# Patient Record
Sex: FEMALE | Race: WHITE | Employment: FULL TIME | URBAN - METROPOLITAN AREA
[De-identification: names, ages, dates, MRNs, and addresses within clinical notes are randomized per-mention and may not be internally consistent; named-entity substitution may affect disease eponyms.]

---

## 2024-07-09 ENCOUNTER — OFFICE VISIT (OUTPATIENT)
Dept: FAMILY MEDICINE CLINIC | Facility: CLINIC | Age: 44
End: 2024-07-09
Payer: COMMERCIAL

## 2024-07-09 ENCOUNTER — LAB ENCOUNTER (OUTPATIENT)
Dept: LAB | Age: 44
End: 2024-07-09
Attending: FAMILY MEDICINE
Payer: COMMERCIAL

## 2024-07-09 VITALS
RESPIRATION RATE: 16 BRPM | BODY MASS INDEX: 39.08 KG/M2 | WEIGHT: 207 LBS | HEIGHT: 61 IN | TEMPERATURE: 98 F | OXYGEN SATURATION: 97 % | HEART RATE: 90 BPM | SYSTOLIC BLOOD PRESSURE: 136 MMHG | DIASTOLIC BLOOD PRESSURE: 80 MMHG

## 2024-07-09 DIAGNOSIS — M76.61 RIGHT ACHILLES TENDINITIS: ICD-10-CM

## 2024-07-09 DIAGNOSIS — Z00.00 ANNUAL PHYSICAL EXAM: Primary | ICD-10-CM

## 2024-07-09 DIAGNOSIS — Z32.01: ICD-10-CM

## 2024-07-09 DIAGNOSIS — Z12.31 SCREENING MAMMOGRAM FOR BREAST CANCER: ICD-10-CM

## 2024-07-09 DIAGNOSIS — L73.2 HIDRADENITIS: ICD-10-CM

## 2024-07-09 DIAGNOSIS — Z00.00 ANNUAL PHYSICAL EXAM: ICD-10-CM

## 2024-07-09 LAB
ALBUMIN SERPL-MCNC: 3.4 G/DL (ref 3.4–5)
ALBUMIN/GLOB SERPL: 0.7 {RATIO} (ref 1–2)
ALP LIVER SERPL-CCNC: 54 U/L
ALT SERPL-CCNC: 22 U/L
ANION GAP SERPL CALC-SCNC: 8 MMOL/L (ref 0–18)
AST SERPL-CCNC: 16 U/L (ref 15–37)
BASOPHILS # BLD AUTO: 0.04 X10(3) UL (ref 0–0.2)
BASOPHILS NFR BLD AUTO: 0.4 %
BILIRUB SERPL-MCNC: 0.3 MG/DL (ref 0.1–2)
BUN BLD-MCNC: 14 MG/DL (ref 9–23)
CALCIUM BLD-MCNC: 9 MG/DL (ref 8.5–10.1)
CHLORIDE SERPL-SCNC: 108 MMOL/L (ref 98–112)
CHOLEST SERPL-MCNC: 141 MG/DL (ref ?–200)
CO2 SERPL-SCNC: 22 MMOL/L (ref 21–32)
CREAT BLD-MCNC: 0.68 MG/DL
EGFRCR SERPLBLD CKD-EPI 2021: 111 ML/MIN/1.73M2 (ref 60–?)
EOSINOPHIL # BLD AUTO: 0.11 X10(3) UL (ref 0–0.7)
EOSINOPHIL NFR BLD AUTO: 1 %
ERYTHROCYTE [DISTWIDTH] IN BLOOD BY AUTOMATED COUNT: 13.3 %
FASTING PATIENT LIPID ANSWER: YES
FASTING STATUS PATIENT QL REPORTED: YES
GLOBULIN PLAS-MCNC: 4.8 G/DL (ref 2.8–4.4)
GLUCOSE BLD-MCNC: 101 MG/DL (ref 70–99)
HCG SERPL QL: POSITIVE
HCT VFR BLD AUTO: 38.8 %
HDLC SERPL-MCNC: 40 MG/DL (ref 40–59)
HGB BLD-MCNC: 12.6 G/DL
IMM GRANULOCYTES # BLD AUTO: 0.04 X10(3) UL (ref 0–1)
IMM GRANULOCYTES NFR BLD: 0.4 %
LDLC SERPL CALC-MCNC: 88 MG/DL (ref ?–100)
LYMPHOCYTES # BLD AUTO: 2.8 X10(3) UL (ref 1–4)
LYMPHOCYTES NFR BLD AUTO: 25 %
MCH RBC QN AUTO: 29.8 PG (ref 26–34)
MCHC RBC AUTO-ENTMCNC: 32.5 G/DL (ref 31–37)
MCV RBC AUTO: 91.7 FL
MONOCYTES # BLD AUTO: 0.94 X10(3) UL (ref 0.1–1)
MONOCYTES NFR BLD AUTO: 8.4 %
NEUTROPHILS # BLD AUTO: 7.27 X10 (3) UL (ref 1.5–7.7)
NEUTROPHILS # BLD AUTO: 7.27 X10(3) UL (ref 1.5–7.7)
NEUTROPHILS NFR BLD AUTO: 64.8 %
NONHDLC SERPL-MCNC: 101 MG/DL (ref ?–130)
OSMOLALITY SERPL CALC.SUM OF ELEC: 287 MOSM/KG (ref 275–295)
PLATELET # BLD AUTO: 288 10(3)UL (ref 150–450)
POTASSIUM SERPL-SCNC: 4 MMOL/L (ref 3.5–5.1)
PROT SERPL-MCNC: 8.2 G/DL (ref 6.4–8.2)
RBC # BLD AUTO: 4.23 X10(6)UL
SODIUM SERPL-SCNC: 138 MMOL/L (ref 136–145)
TRIGL SERPL-MCNC: 66 MG/DL (ref 30–149)
TSI SER-ACNC: 2.23 MIU/ML (ref 0.36–3.74)
VLDLC SERPL CALC-MCNC: 11 MG/DL (ref 0–30)
WBC # BLD AUTO: 11.2 X10(3) UL (ref 4–11)

## 2024-07-09 PROCEDURE — 80061 LIPID PANEL: CPT | Performed by: FAMILY MEDICINE

## 2024-07-09 PROCEDURE — 84703 CHORIONIC GONADOTROPIN ASSAY: CPT | Performed by: FAMILY MEDICINE

## 2024-07-09 PROCEDURE — 80050 GENERAL HEALTH PANEL: CPT | Performed by: FAMILY MEDICINE

## 2024-07-09 RX ORDER — CEPHALEXIN 500 MG/1
500 CAPSULE ORAL 3 TIMES DAILY
Qty: 30 CAPSULE | Refills: 1 | Status: SHIPPED | OUTPATIENT
Start: 2024-07-09 | End: 2024-07-19

## 2024-07-14 NOTE — PROGRESS NOTES
HPI:    Aniya Sneed is a 43 year old female who presents for Physical (Establish NP /C/O- Right ankle/heal pain worsens with rest/Pt would like to get a blood test to confirm pregnancy. Pt has already done multiple urine home tests resulting positive.  /Pt has a scheduled OB appointment on the )   + urine pregnancy at home, missed menses few week, has OB appointment to establish care, would like confirmation via blood testing.   Patient reports pain is not under control.   Location: right achilles  Patient reports overuse injury.   Patient describes pain as an ache and radiates to right ankle/achilles.   Patient reports OTC medication provides relief.   Patient reports symptoms are worse with movement, prolonged standing and sitting.   Patient reports pain has gotten worse.  Mild change in activity recently.   Patient reports   Patient reports no other associated symptoms.   Patient reports no bowel or bladder incontinences.      Past History:   She  has a past medical history of Obesity.   She  has a past surgical history that includes ; skin surgery (); and tonsillectomy.   Her family history includes Cancer in her maternal grandfather and paternal grandfather; Diabetes in her mother; Heart Disorder in her maternal grandmother; Hypertension in her mother; Pulmonary Disease in her father.   She  reports that she has been smoking cigarettes. She has a 12.5 pack-year smoking history. She does not have any smokeless tobacco history on file. She reports that she does not currently use alcohol. She reports that she does not use drugs.     She is not on any long-term medications.   She has no allergies on file.     Current Outpatient Medications on File Prior to Visit   Medication Sig    Prenatal Multivit-Min-Fe-FA (PRENATAL VITAMINS OR) Take by mouth.     No current facility-administered medications on file prior to visit.         REVIEW OF SYSTEMS:   Patient denies shortness of breath, denies chest pain  and denies any recent fevers or chills.    Patient reports no urinary complaints and denies headaches or visual disturbances.   Patient denies any abdominal pain at this time. Patient has no new skin lesions.  Patient reports no acute back pain and reports no dizziness or headaches.   Patient reports no visual disturbances and reports hearing has been about the same.   Patient reports no recent injury or trauma.               EXAM:    /80   Pulse 90   Temp 98.2 °F (36.8 °C)   Resp 16   Ht 5' 1\" (1.549 m)   Wt 207 lb (93.9 kg)   LMP 06/01/2024 (Exact Date)   SpO2 97%   BMI 39.11 kg/m²  Estimated body mass index is 39.11 kg/m² as calculated from the following:    Height as of this encounter: 5' 1\" (1.549 m).    Weight as of this encounter: 207 lb (93.9 kg).    General Appearance:  Alert, cooperative, no distress, appears stated age   Head:  Normocephalic, without obvious abnormality, atraumatic   Eyes:  conjunctiva/cornea is not erythematous.        Nose: No nasal drainage.    Throat: No erythema    Neck: Supple, symmetrical, trachea midline, and normal ROM  thyroid: no obvious nodules   Back:   Symmetric, no curvature, ROM normal, no CVA tenderness   Lungs:   Clear to auscultation bilaterally, respirations unlabored   Chest Wall:  No tenderness or deformity   Heart:  Regular rate and rhythm, S1, S2 normal, no murmur,   Abdomen:   Soft, non-tender, bowel sounds active. No hernia.    Genitalia:     Rectal:     Extremities: Extremities normal, atraumatic, no cyanosis or edema   Pulses: 2+ and symmetric   Skin: Skin color, texture, turgor normal, no new rashes    Lymph nodes: No obvious cervical adenopathy.    Neurologic and psych: Normal speech, Alert and oriented x 3.   Normal mood, normal insight and judgment.    Right achilles tenderness-negative cavazos.     Hidradenitis-along axilla.           ASSESSMENT AND PLAN:   1. Annual physical exam  -wellness visit was done  - CBC With Differential With  Platelet; Future  - Comp Metabolic Panel (14); Future  - Lipid Panel; Future  - TSH W Reflex To Free T4; Future    2. Screening mammogram for breast cancer  -will check mammogram as below:  - [facility] 2D+3D SCREENING BILAT (CPT=77067/02735); Future    3. Pregnancy confirmed by positive urine test (HCC)  -will check, has OB appointment already  - HCG, BETA SUBUNIT, QUAL [8435] [Q]; Future    4. Right Achilles tendinitis  -will check right achilles, CPM  - US ANKLE RIGHT LIMITED (CPT=76882); Future    5. Hidradenitis-chronic  -oral abx:  - cephalexin (KEFLEX) 500 MG Oral Cap; Take 1 capsule (500 mg total) by mouth 3 (three) times daily for 10 days.  Dispense: 30 capsule; Refill: 1          Franco Valverde MD, 7/14/2024, 4:48 PM     Note to patient: The 21st Century Cures Act makes medical notes like these available to patients in the interest of transparency. However, this is a medical document intended as peer to peer communication. It is written in medical language and may contain abbreviations or verbiage that are unfamiliar. It may appear blunt or direct. Medical documents are intended to carry relevant information, facts as evident, and the clinical opinion of the practitioner who signs the document.

## 2024-10-23 NOTE — PROGRESS NOTES
Pt here for level II ultrasound/doctor consult  + FM  Pt c/o sharp left sided discomfort, denies uterine tightening, vag bleeding and leaking fluid.

## 2024-10-23 NOTE — PROGRESS NOTES
Outpatient Maternal-Fetal Medicine Consultation    Dear Dr. Kyle    Thank you for requesting ultrasound evaluation and maternal fetal medicine consultation on your patient Aniya Guy.  As you are aware she is a 44 year old female  with a nagy pregnancy and an Estimated Date of Delivery: 3/8/25.  A maternal-fetal medicine consultation was requested secondary to Advanced Maternal Age and Obesity.  Her prenatal records and labs were reviewed.    Prior pregnancy uncomplicated.  ROS    HISTORY  OB History    Para Term  AB Living   2 1 1 0 0 0   SAB IAB Ectopic Multiple Live Births   0 0 0 0 0      # Outcome Date GA Lbr Omar/2nd Weight Sex Type Anes PTL Lv   2 Current            1 Term 05   5 lb 4 oz (2.381 kg) F NORMAL SPONT          Allergies:  Allergies[1]   Current Meds:  Current Outpatient Medications   Medication Sig Dispense Refill    aspirin 81 MG Oral Tab EC Take 1 tablet (81 mg total) by mouth daily.      Prenatal Multivit-Min-Fe-FA (PRENATAL VITAMINS OR) Take by mouth.          HISTORY:  Past Medical History:    Obesity      Past Surgical History:   Procedure Laterality Date          Skin surgery      Hydratenitis    Tonsillectomy        Family History   Problem Relation Age of Onset    Diabetes Mother     Hypertension Mother     Pulmonary Disease Father         Copd    Cancer Maternal Grandfather     Heart Disorder Maternal Grandmother     Cancer Paternal Grandfather       Social History     Socioeconomic History    Marital status:    Tobacco Use    Smoking status: Every Day     Current packs/day: 0.50     Average packs/day: 0.5 packs/day for 25.0 years (12.5 ttl pk-yrs)     Types: Cigarettes   Substance and Sexual Activity    Alcohol use: Not Currently    Drug use: Never   Other Topics Concern    Caffeine Concern No    Exercise No    Seat Belt No    Special Diet No    Stress Concern No    Weight Concern Yes     Social Drivers of Health     Financial  Resource Strain: Unknown (7/3/2024)    Financial Resource Strain     Difficulty of Paying Living Expenses: Patient declined     Med Affordability: No   Food Insecurity: No Food Insecurity (7/3/2024)    Food Insecurity     Food Insecurity: Never true   Transportation Needs: No Transportation Needs (7/3/2024)    Transportation Needs     Lack of Transportation: No   Stress: No Stress Concern Present (7/3/2024)    Stress     Feeling of Stress : No   Housing Stability: Low Risk  (7/3/2024)    Housing Stability     Housing Instability: No          PHYSICAL EXAMINATION:  /72 (BP Location: Right arm, Patient Position: Sitting, Cuff Size: large)   Pulse 78   Ht 5' 1\" (1.549 m)   Wt 203 lb (92.1 kg)   LMP 06/01/2024 (Exact Date)   BMI 38.36 kg/m²   Physical Exam  Constitutional:       Appearance: Normal appearance.   Abdominal:      Palpations: Abdomen is soft.      Tenderness: There is no abdominal tenderness.   Neurological:      Mental Status: She is alert.   Psychiatric:         Mood and Affect: Mood normal.         Behavior: Behavior normal.           OBSTETRIC ULTRASOUND  The patient had a level II ultrasound today which revealed size consistent with dates and a normal detailed anatomic survey.  Ultrasound findings:   Single IUP in cephalic presentation.    Placenta is posterior.   A 3 vessel cord is noted.  Cardiac activity is present at 132 bpm   g ( 0 lb 13 oz);   MVP is 4.9 cm    The cervix was not able to be clearly visualized and appeared short by transabdominal ultrasound, therefore transvaginal ultrasound was performed. Transvaginal US findings: Cervix is closed, long and no funneling seen measuring 4 mm   Uterus and adnexa appeared normal today on ultrasound    The fetal measurements are consistent with the established EDC. No ultrasound evidence of structural abnormalities are seen today. The nasal bone is present. She understands that ultrasound exam cannot exclude genetic abnormalities and  that genetic testing is recommended. The limitations of ultrasound were discussed.  See PACS/Imaging Tab For Complete Ultrasound Report  I interpreted the results and reviewed them with the patient.    DISCUSSION  During her visit we discussed and reviewed the following issues:  ADVANCED MATERNAL AGE    Background  I reviewed with the patient that pregnancies in women of advanced maternal age (35 or older at delivery) are associated with elevated risks. Specifically, there is a higher rate of:  Fetal malformations  Preeclampsia  Gestational diabetes  Intrauterine fetal death    As a result, enhanced pregnancy surveillance is advised for these patients including a comprehensive ultrasound to assess for fetal malformations (at 20 weeks) and a third trimester ultrasound assessment for fetal growth (at 32 weeks). In addition, weekly NST's (initiating at 36 weeks gestation for women 35-39 years and at 32 weeks gestation for women 40 years and older) are also advised. Routine obstetric care is more than adequate to assess for gestational diabetes and preeclampsia; hence, no further significant alterations in obstetric care are advised.    Medical Complications    Women 35 years of age or older can expect to experience two to three fold higher rates of hospitalization,  delivery, and pregnancy-related complications when compared to their younger counterparts.  The two most common medical problems complicating these  pregnanccies are hypertension and diabetes.   The incidence of preeclampsia in the general obstetric population is 3 to 4 percent; this increases to 5 to 10 percent in women over age 40 and is as high as 35 percent in women over age 50.   The incidence of gestational diabetes in the general obstetric population is 3 percent, rising to 7 to 12 percent in women over age 40 and 20 percent in women over age 50.  Women 35 years of age or older are more likely to be delivered by . The   delivery rate in the general obstetric population of the [place] is almost 30 percent, compared to almost 50 percent in women over age 40 to 45 and almost 80 percent in women age 50 to 63.          Fetal Death        A decision analysis tool using data from the [place] [facility] predicted a strategy of weekly antepartum testing and labor induction would lower the risk of unexplained fetal death in women 35 years of age or older. In this model, weekly testing starting at 36 weeks of gestation would drop the risk of fetal death from 5.2 to 1.3 per 1000 pregnancies. While a policy of antepartum testing in older women does increase the chance that a women will be induced (71 inductions per fetal death averted) and thereby increases her risk of having a  delivery, only 14 additional cesareans would need to be performed to avert one unexplained fetal death.  Hence, weekly NST's are advised for women of advanced maternal age; testing should be initiated at 36 weeks for women 35-39 years and at 32 weeks for women 40 years and older.    Fetal Malformations    Cardiac malformations, clubfoot, and diaphragmatic hernia appear to occur with increased frequency in offspring of older women. These abnormalities are structural and unrelated to aneuploidy, thus they would not be detected by karyotype analysis.  For these reasons a complete, detailed ultrasound (level II) is advised even if the fetus has a normal karyotype.      Fetal Aneuploidy      Invasive Testing  I offered invasive genetic testing (amniocentesis, chorionic villus sampling) after reviewing the diagnostic accuracy of these tests as well as the procedure associated loss rate (1:500 for genetic amniocentesis).    She ultimately does not desire invasive genetic testing.     We discussed  the increased risk of chromosomal abnormalities associated with advanced maternal age at age  44 year old. She understands that ultrasound exam cannot  exclude potential genetic abnormalities.  Her estimated risk based on maternal age at term with any chromosome abnormality is about 1: 30 and with Down Syndrome is about 1: 45.   We also discussed the risks and benefits of having  genetic testing ([facility] and amniocentesis) performed.      Non-invasive Pregnancy Testing (NIPT)  I reviewed current non-invasive screening options. Currently non-invasive pregnancy testing (NIPT) offers the highest detection rate (with the lowest false positive rate) for the detection of fetal aneuploidy amongst high-risk patients. The limitations of detailed mid-trimester sonography was reviewed with the patient. First trimester screening and second trimester multiple-marker serum serum screening as alternative aneuploidy screening options were also reviewed. However, both of these tests are associated with lower detection and higher false positive rates.    OBESITY:  Her BMI prior to pregnancy was 38  Obesity during pregnancy is associated with numerous maternal and  risks.  It is not clear whether obesity is a direct cause of adverse pregnancy outcome or whether the association between obesity and adverse pregnancy outcome is due to factors such as diabetes mellitus.   Data suggest that obese women should be encouraged to undertake a weight reduction program (diet, exercise, behavior modification, and possibly bariatric surgery in some cases) prior to attempting to conceive.            Subfertility in obese women is most commonly related to ovulatory dysfunction, and, in some obese women, the ovulatory dysfunction is related to polycystic ovary syndrome (PCOS). It is also important to note that even among ovulatory women, increasing obesity is associated with decreasing spontaneous pregnancy rates.  The increased risk of miscarriage in obese women may be because such women often have PCOS or isolated insulin resistance.                 Due to its strong association with obesity  in the general population, type 2 diabetes mellitus is one of the two most common medical complications of the obese . The increased risk of type 2 diabetes is primarily related to an exaggerated increase in insulin resistance in the obese state. It is reasonable to screen obese gravidas for undiagnosed pregestational diabetes in the first trimester.   Glucose intolerance associated with gestational diabetes generally resolves postpartum; however, obese women with a history of gestational diabetes have a two-fold increased prevalence of subsequent type 2 diabetes.           An association between obesity and hypertensive disorders during pregnancy has been consistently reported.  In particular, maternal weight and BMI are independent risk factors for preeclampsia.             Studies have found that the increased risk of  birth in obese gravidas is primarily associated with obesity-related medical and  complications, rather than an intrinsic predisposition to spontaneous  birth. Prevention of  birth in these patients, therefore, should be directed toward prevention or management of medical and obstetrical complications.               Both prepregnancy obesity and excessive maternal weight gain before or during pregnancy contribute to an increased probability of  delivery.  It has also been hypothesized that obesity may lead to dystocia due to increased soft tissue deposition in the maternal pelvis.    delivery in the obese  is associated with numerous perioperative concerns, including emergency delivery, prolonged incision to delivery interval, blood loss >1000 mL, longer operative times, wound infection, thromboembolism, and endometritis.            Maternal obesity appears to be associated with a small increase in the absolute rate of some congenital anomalies (primarily neural tube defect and cardiac), and the risk may increase with increasing maternal  weight.  Level II ultrasound is advised for women with obesity.  The risk of neural tube defects increased significantly with maternal weight.    The analysis found that overweight and obese pregnant women experienced significantly more stillbirths than normal weight women.  Third trimester  testing is advised.     Prevention of Preeclampsia    Antiplatelet Agents  The observation that preeclampsia is associated with increased platelet turnover and increased platelet-derived thromboxane levels led to randomized trials evaluating low-dose aspirin therapy in women thought to be at increased risk of the disease. As opposed to higher dose aspirin therapy, low-dose aspirin (60 to 150 mg per day) diminishes platelet thromboxane synthesis while maintaining vascular wall prostacyclin synthesis. Although not well studied, the beneficial effect of low-dose aspirin for prevention of preeclampsia may also be in part related to modulation of inflammation. The drug has been studied for both prevention of preeclampsia and prevention of progression from mild to severe disease. It appears to result in a modest reduction in risk of preeclampsia when given to women at moderate to high risk of preeclampsia.  This approach has been studied in over 35,000 women, for both prevention of preeclampsia and prevention of progression of the disease. Low dose aspirin has a modest impact on pregnancy outcome; it reduces the risk of preeclampsia, as well as other adverse pregnancy outcomes (eg,  delivery, fetal growth restriction) by about 10 to 20 percent. Low dose aspirin is safe in pregnancy; thus, it is a reasonable strategy in women with a moderate to high risk of preeclampsia in whom the benefits outweigh the risks.     Clinical Guidelines  Based on the available data, low-dose aspirin is advised for women at high risk for preeclampsia. There is no consensus on the criteria that confer high risk. The [place] Preventive  Services Task Force (USPSTF) risk criteria are reasonable.  USPSTF criteria for high risk include one or more of the following:   Previous pregnancy with preeclampsia, especially early onset and with an adverse outcome   Multifetal gestation  Chronic hypertension   Type 1 or 2 diabetes mellitus  Chronic kidney disease  Autoimmune disease (antiphospholipid syndrome, systemic lupus erythematosus)     Women with multiple moderate risk factors for preeclampsia are considered high risk, as well. Identification of women with an appropriate combination of moderate risk factors to be considered high risk is subjective and determined case by case, as the data describing the magnitude of the association between each of these risk factors and development of preeclampsia vary widely and lack consistency. USPSTF criteria for moderate risk include:  Nulliparity  Obesity (body mass index >30 kg/m2)  Family history of preeclampsia in mother or sister  Age >=35 years  Sociodemographic characteristics (, low socioeconomic level)  Personal risk factors (eg, history of low birth weight or small for gestational age, previous adverse pregnancy outcome, >10 year pregnancy interval)     If used, daily low-dose aspirin should be initiated in the 12th or 13th week of gestation, although adverse effects from earlier initiation (eg, preconception) have not been documented. The optimum low dose is unclear; 81 mg is readily available, but 100 or 150 mg (which are not available in some countries including the [place] may be more effective.  In some pregnant women, platelet function is not inhibited by the 81 mg dose but is altered by higher dosing. Hence, current evidence has suggested a daily dose of 100 to 150 mg of aspirin rather than a lower dose. In the [place], this can be achieved by taking one and one-half 81 mg tablets; however, taking one 81 mg tablet is also reasonable since this is the commercially available  dose and has proven efficacy. For prevention of preeclampsia, no trials have evaluated the efficacy of a higher dose of aspirin, which could be achieved easily by taking two 81 mg tablets or splitting a 325 mg tablet. For prevention of myocardial infarction and stroke in nonpregnant individuals, aspirin appears to be equally effective at doses between 75 and 325 mg per day.  The safety of low-dose aspirin use in the second and third trimesters is well established, but questions linger regarding use in the first trimester (possible increase in minor vaginal bleeding or gastroschisis). Early therapy (before 16 weeks) may be important since the pathophysiologic features of preeclampsia develop at this time, weeks before clinical disease is apparent. However, available evidence is inconsistent about the importance of early initiation of therapy, possibly because aspirin has major effects on prostacyclin production and endothelial function throughout gestation.  Aspirin is discontinued 5 to 10 days before expected delivery to diminish the risk of bleeding during delivery; however, no adverse maternal or fetal effects related to low-dose aspirin have been proven.  Major questions remain as to which, if any, subgroups of women are more likely to benefit from low-dose aspirin therapy, when treatment should be started (first or second trimester), and the optimum dose to inhibit placental PGH synthase (cyclooxygenase) and also allow the anti-inflammatory effects of low-dose aspirin.             States that her family has small babies.  Only had 1 US during her prior pregnancy which would have been typical  for that time period.   She is 5 feet 1 in tall.    IMPRESSION:  IUP at 20w4d   AMA --NIPT low risk, declines invasive testing   Obesity  Short cervix not found      RECOMMENDATIONS:  Continue care with Dr. Kyle  Follow-up Growth ultrasound with BPP at 32 weeks.  Weekly NST's at 32 weeks.  Twice weekly testing at 38  weeks - weekly NST and weekly BPP.  Delivery at 39-40 weeks.  Continue low dose aspirin  mg daily  Under 11-20 lb weight gain for pregnancy.            Thank you for allowing me to participate in the care of your patient.  Please do not hesitate to contact me if additional questions or concerns arise.      Christine Mazariegos M.D.    40 minutes spent in review of records, patient consultation, documentation and coordination of care.  The relevant clinical matter(s) are summarized above.     Note to patient and family  The 21st Century Cures Act makes medical notes available to patients in the interest of transparency.  However, please be advised that this is a medical document.  It is intended as [id number] communication.  It is written and medical language may contain abbreviations or verbiage that are technical and unfamiliar.  It may appear blunt or direct.  Medical documents are intended to carry relevant information, facts as evident, and the clinical opinion of the practitioner.         [1] Not on File

## 2025-01-17 NOTE — PROGRESS NOTES
Pt here for growth ultrasound/BPP  + FM  Pt states feeling occas uterine tightening, denies vag bleeding and leaking fluid.

## 2025-01-17 NOTE — PROGRESS NOTES
Outpatient Maternal-Fetal Medicine Follow-Up     Dear Dr. Kyle,     Thank you for requesting ultrasound evaluation and maternal fetal medicine consultation on your patient Aniya Guy.  As you are aware she is a 44 year old female  with a Carrillo pregnancy and an Estimated Date of Delivery: 3/8/25.  She returned to maternal-fetal medicine today for a follow-up visit.  Her history was reviewed from her prior visit and there were no interval changes.    A 1 hour glucose tolerance test was elevated 24.We reviewed that she needs her 3-hour glucose tolerance test.  For the prenatal records she electively deferred this till after the holidays.  She reports that she is going to do the 3-hour test next week.     Antepartum Risk Factors  Advanced maternal age, low risk cell free DNA screen  Class II obesity complicating pregnancy      S: She reports good fetal movement.  She has no concerns at this time    PHYSICAL EXAMINATION:  /82 (BP Location: Radial, Patient Position: Sitting, Cuff Size: adult)   Pulse 73   Ht 5' 1\" (1.549 m)   Wt 216 lb (98 kg)   LMP 2024 (Exact Date)   BMI 40.81 kg/m²   General: alert and oriented, no acute distress  Abdomen: gravid, soft, non-tender  Extremities: non-tender, no edema     OBSTETRIC ULTRASOUND  The patient had a follow-up fetal ultrasound today which I interpreted the results and reviewed them with the patient.    Ultrasound Findings:  Single IUP in cephalic presentation.    Placenta is posterior.   Cardiac activity is present at 158 bpm  EFW 2016 g ( 4 lb 7 oz); 42%.    PHILLIP is  12.5 cm.  MVP is 4.3 cm  BPP is 8/8.     The fetal measurements are consistent with established EDC. No gross ultrasound evidence of structural abnormalities are seen today. The patient understands that ultrasound cannot rule out all structural and chromosomal abnormalities.     See imaging tab for the complete US report.     DISCUSSION  During her visit we discussed and  reviewed the following issues:  ADVANCED MATERNAL AGE    I reviewed with the patient that pregnancies in women of advanced maternal age (35 or older at delivery) are associated with elevated risks. Specifically, there is a higher rate of:  Fetal malformations  Preeclampsia  Gestational diabetes  Intrauterine fetal death    As a result, enhanced pregnancy surveillance is advised for these patients including a comprehensive ultrasound to assess for fetal malformations (at 20 weeks) and a third trimester ultrasound assessment for fetal growth (at 32 weeks). In addition, weekly NST's (initiating at 36 weeks gestation for women 35-39 years and at 32 weeks gestation for women 40 years and older) are also advised. Routine obstetric care is more than adequate to assess for gestational diabetes and preeclampsia; hence, no further significant alterations in obstetric care are advised.  See prior [facility] note from 10/23/2024 for detailed review    OBESITY:  Her BMI prior to pregnancy was 38  Obesity during pregnancy is associated with numerous maternal and  risks which were discussed previously and reviewed.See prior [facility] note from 10/23/2024 for detailed review    Prevention of Preeclampsia  The role of low-dose aspirin prevention of preeclampsia was discussed previously and reviewed.  See prior [facility] note from 10/23/2024 for detailed review         States that her family has small babies.  Only had 1 US during her prior pregnancy which would have been typical  for that time period.   She is 5 feet 1 in tall.    We reviewed the signs and symptoms of preeclampsia,  labor and monitoring fetal movement.  We discussed reasons for her to call her physician.    We discussed the recommended plan of care based on her  risk factors.  Aniya and her significant other, Theron, had their questions answered to their satisfaction.      IMPRESSION:  IUP at 32w6d  Normal fetal growth and  testing  AMA --NIPT low  risk, declined invasive testing previously  Obesity, class II with excessive gestational weight gain  Elevated Glucola screen      RECOMMENDATIONS:  Continue care with Dr. Kyle  Weekly NST's at 32 weeks.  Twice weekly testing at 38 weeks - weekly NST and weekly BPP.  Delivery at 39-40 weeks.  Continue low dose aspirin  mg daily  Limit additional weight gain  3-hour glucose tolerance test is needed.  If she is not able to perform this test, I would advise her to check her blood sugars 4 times daily for 2 weeks on her regular diet to determine if she has gestational diabetes         Total time spent 30 minutes this calendar day which includes preparing to see the patient including chart review, obtaining and/or reviewing additional medical history, performing a physical exam and evaluation, documenting clinical information in the electronic medical record, independently interpreting results, counseling the patient, communicating results to the patient/family/caregiver and coordinating care.     Case discussed with patient who demonstrated understanding and agreement with plan.     Thank you for allowing me to participate in the care of this patient.  Please feel free to contact me with any questions.    Avis Sharma MD  Maternal-Fetal Medicine       Note to patient and family:  The 21st Century Cures Act makes medical notes available to patients in the interest of transparency.  However, please be advised that this is a medical document.  It is intended as a peer to peer communication.  It is written in medical language and may contain abbreviations or verbiage that are technical and unfamiliar.  It may appear blunt or direct.  Medical documents are intended to carry relevant information, facts as evident, and the clinical opinion of the practitioner.

## 2025-02-18 NOTE — PROGRESS NOTES
Outpatient Maternal-Fetal Medicine Follow-Up     Dear Dr. Kyle,     Thank you for requesting ultrasound evaluation and maternal fetal medicine consultation on your patient Aniya Guy.  As you are aware she is a 44 year old female  with a Carrillo pregnancy and an Estimated Date of Delivery: 3/8/25.  She returned to maternal-fetal medicine today for a follow-up visit.  Her history was reviewed from her prior visit and since that time she has been diagnosed with gestational diabetes       Antepartum Risk Factors  Advanced maternal age, low risk cell free DNA screen  Class II obesity complicating pregnancy  Gestational diabetes      S: She reports good fetal movement.  She has no concerns at this time.  She is on the GDM diet and testing her blood sugar 4 times daily.      PHYSICAL EXAMINATION:  /82 (BP Location: Right arm, Patient Position: Sitting, Cuff Size: large)   Pulse 70   Ht 5' 1\" (1.549 m)   Wt 219 lb (99.3 kg)   LMP 2024 (Exact Date)   BMI 41.38 kg/m²   General: alert and oriented, no acute distress  Abdomen: gravid, soft, non-tender  Extremities: non-tender, no edema     OBSTETRIC ULTRASOUND  The patient had a follow-up fetal ultrasound today which I interpreted the results and reviewed them with the patient.    Ultrasound Findings:  Single IUP in cephalic presentation.    Placenta is posterior.   Cardiac activity is present at 130 bpm  EFW 2757 g ( 6 lb 1 oz); 21%.    PHILLIP is  6.1 cm.  MVP is 3.6 cm  BPP is 8/8.     The fetal measurements are consistent with established EDC. No gross ultrasound evidence of structural abnormalities are seen today. The patient understands that ultrasound cannot rule out all structural and chromosomal abnormalities.     See imaging tab for the complete US report.     DISCUSSION  During her visit we discussed and reviewed the following issues:  ADVANCED MATERNAL AGE    I reviewed with the patient that pregnancies in women of advanced maternal  age (35 or older at delivery) are associated with elevated risks. Specifically, there is a higher rate of:  Fetal malformations  Preeclampsia  Gestational diabetes  Intrauterine fetal death    As a result, enhanced pregnancy surveillance is advised for these patients including a comprehensive ultrasound to assess for fetal malformations (at 20 weeks) and a third trimester ultrasound assessment for fetal growth (at 32 weeks). In addition, weekly NST's (initiating at 36 weeks gestation for women 35-39 years and at 32 weeks gestation for women 40 years and older) are also advised. Routine obstetric care is more than adequate to assess for gestational diabetes and preeclampsia; hence, no further significant alterations in obstetric care are advised.  See prior [facility] note from 10/23/2024 for detailed review    OBESITY:  Her BMI prior to pregnancy was 38  Obesity during pregnancy is associated with numerous maternal and  risks which were discussed previously and reviewed.See prior [facility] note from 10/23/2024 for detailed review    Prevention of Preeclampsia  The role of low-dose aspirin prevention of preeclampsia was discussed previously and reviewed.  See prior [facility]FM note from 10/23/2024 for detailed review      GESTATIONAL DIABETES      The patient was informed of the potential implications and risks associated with GDM to her and her fetus, especially when poorly controlled. We discussed the increased incidence of macrosomia and the potential for related birth injury to her and her baby. We talked about the increase risks associated risk of fetal hyperinsulinemia, jaundice, electrolyte imbalance, seizure activity, IUFD and other adverse obstetric outcomes. We also reviewed her  increased risk of having diabetes later in life. The importance of good glycemic control and avoidance of prolonged hypoglycemia and hyperglycemia was addressed.    She was instructed on the diabetic diet on 2025; her diet was modified to  provide three meals and three snacks with fewer carbohydrates.  She received instruction on self-monitoring of blood glucose.  She was advised to assess  her blood sugars four times daily (fasting and 2 hour postprandially).   Fasting blood glucose should be less than 95 mg/dl and two hour postprandial values should be less than 120 mg/dl.     Her capillary blood glucose records were reviewed today; her compliance with the recommended four times daily assessments (fasting and two-hour post-prandial) is fair.   Her overall glucose control is inadequate.    The patient is presently on diet therapy; her compliance with her diabetic diet regimen was reviewed and it is fair.     We compared and contrasted insulin (long and short acting) and metformin to manage blood sugars in pregnancy.  We discussed insulin as the gold standard therapy in pregnancy and that it does not cross to the fetal circulation.  Metformin is increasingly being used in pregnancy but the long term data on / childhood effects are lacking.  Metformin crosses the placenta and  levels are >70% of the maternal level at delivery.  Metformin is not associated with increased rates for NICU admission,  hypoglycemia or LGA when its use controls the blood sugars to the recommended targets.  There is emerging information,however, that there is an increase in childhood obesity, and possibly metabolic syndrome in children exposed to Metformin throughout the entire pregnancy.  Currently, use in the third trimester for management of gestational diabetes is within the standard of care.     Based on the above discussion and her gestational age, and the need for improved glycemic control , metformin is advised      Medical Regimen Recommendation:   Continue ADA diet  Metformin 500 mg daily with dinner  Send blood sugar logs MFM weekly for review    We reviewed the signs and symptoms of preeclampsia,  labor and monitoring fetal movement.   We discussed reasons for her to call her physician.    We discussed the recommended plan of care based on her  risk factors.  Aniya and her significant other, Theron, had their questions answered to their satisfaction.      IMPRESSION:  IUP at 37w3d  Normal fetal growth and  testing  AMA --NIPT low risk, declined invasive testing previously  Obesity, class II with excessive gestational weight gain  Gestational diabetes; A2      RECOMMENDATIONS:  Continue care with Dr. Kyle  Twice weekly NSTs  Delivery at 38-39w6d for GDMA2 that is controlled  Continue low dose aspirin  mg daily  Limit additional weight gain         Total time spent 30 minutes this calendar day which includes preparing to see the patient including chart review, obtaining and/or reviewing additional medical history, performing a physical exam and evaluation, documenting clinical information in the electronic medical record, independently interpreting results, counseling the patient, communicating results to the patient/family/caregiver and coordinating care.     Case discussed with patient who demonstrated understanding and agreement with plan.     Thank you for allowing me to participate in the care of this patient.  Please feel free to contact me with any questions.    Avis Sharma MD  Maternal-Fetal Medicine       Note to patient and family:  The 21st Century Cures Act makes medical notes available to patients in the interest of transparency.  However, please be advised that this is a medical document.  It is intended as a peer to peer communication.  It is written in medical language and may contain abbreviations or verbiage that are technical and unfamiliar.  It may appear blunt or direct.  Medical documents are intended to carry relevant information, facts as evident, and the clinical opinion of the practitioner.

## 2025-02-18 NOTE — PROGRESS NOTES
Pt here for Diabetic Consult/Growth ultrasound/BPP  +fm noted per patient  Pt denies complaints .

## 2025-02-25 NOTE — ANESTHESIA PREPROCEDURE EVALUATION
PRE-OP EVALUATION    Patient Name: Aniya Guy    Admit Diagnosis: pregnancy  Pregnancy (HCC)    Pre-op Diagnosis: * No pre-op diagnosis entered *        Anesthesia Procedure: LABOR ANALGESIA    * No surgeons found in log *    Pre-op vitals reviewed.  Temp: 97.7 °F (36.5 °C)  Pulse: 69  Resp: 20  BP: 130/75  SpO2: 94 %  Body mass index is 41.76 kg/m².    Current medications reviewed.  Hospital Medications:   [COMPLETED] misoprostol (CYTOTEC) partial tablets 25 mcg  25 mcg Vaginal Once    HYDROmorphone (Dilaudid) 1 MG/ML injection 1 mg  1 mg Intravenous Q2H PRN    [COMPLETED] misoprostol (CYTOTEC) partial tablets 25 mcg  25 mcg Vaginal Once    lactated ringers IV bolus 1,000 mL  1,000 mL Intravenous Once    fentaNYL-bupivacaine 2 mcg/mL-0.125% in sodium chloride 0.9% 100 mL EPIDURAL infusion premix  12 mL/hr Epidural Continuous    fentaNYL (Sublimaze) 50 mcg/mL injection 100 mcg  100 mcg Epidural Once    lidocaine 1.5%-EPINEPHrine 1:200,000 (Xylocaine-Epinephrine) injection  5 mL Injection PRN    bupivacaine PF (Marcaine) 0.25% injection  30 mL Injection PRN    lidocaine PF (Xylocaine-MPF) 2% injection  5 mL Injection PRN    sodium chloride 0.9% PF injection 10 mL  10 mL Injection PRN    ePHEDrine (PF) 25 MG/5 ML injection 5 mg  5 mg Intravenous PRN    nalbuphine (Nubain) 10 mg/mL injection 2.5 mg  2.5 mg Intravenous Q15 Min PRN    fentaNYL-bupivacaine in sodium chloride 0.9% 2 mcg/mL-0.125% EPIDURAL infusion premix        sodium chloride 0.9% PF 0.9% injection        fentaNYL (Sublimaze) 50 mcg/mL injection        ePHEDrine (PF) 25 MG/5 ML injection        lactated ringers infusion   Intravenous Continuous    lactated ringers IV bolus 500 mL  500 mL Intravenous PRN    acetaminophen (Tylenol Extra Strength) tab 500 mg  500 mg Oral Q6H PRN    acetaminophen (Tylenol Extra Strength) tab 1,000 mg  1,000 mg Oral Q6H PRN    ibuprofen (Motrin) tab 600 mg  600 mg Oral Once PRN    ondansetron (Zofran) 4 MG/2ML injection 4  mg  4 mg Intravenous Q6H PRN    oxyTOCIN in sodium chloride 0.9% (Pitocin) 30 Units/500mL infusion premix  62.5-900 felicia-units/min Intravenous Continuous    terbutaline (Brethine) 1 MG/ML injection 0.25 mg  0.25 mg Subcutaneous PRN    sodium citrate-citric acid (Bicitra) 500-334 MG/5ML oral solution 30 mL  30 mL Oral PRN    ropivacaine (Naropin) 0.5% injection  30 mL Injection PRN    [COMPLETED] labetalol (Trandate) 5 mg/mL injection 20 mg  20 mg Intravenous Once PRN    And    [COMPLETED] labetalol (Trandate) 5 mg/mL injection 40 mg  40 mg Intravenous Once PRN    And    [COMPLETED] labetalol (Trandate) 5 mg/mL injection 80 mg  80 mg Intravenous Once PRN    And    hydrALAzine (Apresoline) 20 mg/mL injection 10 mg  10 mg Intravenous Once PRN    dextrose in lactated ringers 5% infusion  50 mL/hr Intravenous Continuous PRN    lactated ringers infusion   mL/hr Intravenous Continuous PRN    sodium chloride 0.9% infusion  25 mL/hr Intravenous Continuous PRN    glucose (Dex4) 15 GM/59ML oral liquid 15 g  15 g Oral Q15 Min PRN    Or    glucose (Glutose) 40% oral gel 15 g  15 g Oral Q15 Min PRN    Or    glucose-vitamin C (Dex-4) chewable tab 4 tablet  4 tablet Oral Q15 Min PRN    Or    dextrose 50% injection 50 mL  50 mL Intravenous Q15 Min PRN    Or    glucose (Dex4) 15 GM/59ML oral liquid 30 g  30 g Oral Q15 Min PRN    Or    glucose (Glutose) 40% oral gel 30 g  30 g Oral Q15 Min PRN    Or    glucose-vitamin C (Dex-4) chewable tab 8 tablet  8 tablet Oral Q15 Min PRN    insulin regular human (Novolin R, Humulin R) 100 Units in sodium chloride 0.9% 100 mL standard infusion (100 mL)  0.5-5.5 Units/hr Intravenous Continuous    [COMPLETED] magnesium sulfate 40 mg/mL 6 g BOLUS FROM BAG infusion *For OB Use*  6 g Intravenous Once    Followed by    magnesium sulfate 40 mg/mL infusion premix  2 g/hr Intravenous Continuous    calcium gluconate injection 1 g  1 g Intravenous Once PRN    [COMPLETED] labetalol (Normodyne) tab 200  mg  200 mg Oral Once    labetalol (Normodyne) tab 200 mg  200 mg Oral 2 times per day    [COMPLETED] misoprostol (CYTOTEC) partial tablets 25 mcg  25 mcg Vaginal Once    [COMPLETED] metFORMIN (Glucophage) tab 500 mg  500 mg Oral Once       Outpatient Medications:   Prescriptions Prior to Admission[1]    Allergies: Patient has no known allergies.      Anesthesia Evaluation        Anesthetic Complications           GI/Hepatic/Renal    Negative GI/hepatic/renal ROS.                             Cardiovascular    Negative cardiovascular ROS.    Exercise tolerance: good     MET: >4                                           Endo/Other      (+) diabetes                            Pulmonary    Negative pulmonary ROS.                       Neuro/Psych    Negative neuro/psych ROS.                                  Past Surgical History:   Procedure Laterality Date          Skin surgery      Hydratenitis    Tonsillectomy       Social History     Socioeconomic History    Marital status:    Tobacco Use    Smoking status: Every Day     Current packs/day: 0.50     Average packs/day: 0.5 packs/day for 25.0 years (12.5 ttl pk-yrs)     Types: Cigarettes    Smokeless tobacco: Never   Vaping Use    Vaping status: Never Used   Substance and Sexual Activity    Alcohol use: Not Currently    Drug use: Never   Other Topics Concern    Caffeine Concern No    Exercise No    Seat Belt No    Special Diet No    Stress Concern No    Weight Concern Yes     History   Drug Use Unknown     Available pre-op labs reviewed.  Lab Results   Component Value Date    WBC 14.4 (H) 2025    RBC 4.25 2025    HGB 12.9 2025    HCT 37.5 2025    MCV 88.2 2025    MCH 30.4 2025    MCHC 34.4 2025    RDW 13.6 2025    .0 2025     Lab Results   Component Value Date     2025    K 3.9 2025     2025    CO2 22.0 2025    BUN 10 2025    CREATSERUM 0.54 (L)  02/24/2025    GLU 98 02/24/2025    CA 9.2 02/24/2025            Airway      Mallampati: III  Mouth opening: >3 FB  TM distance: > 6 cm  Neck ROM: full Cardiovascular    Cardiovascular exam normal.         Dental    Dentition appears grossly intact         Pulmonary    Pulmonary exam normal.                 Other findings              ASA: 3   Plan: epidural       Post-procedure pain management plan discussed with surgeon and patient.      Plan/risks discussed with: patient                Present on Admission:  **None**             [1]   Medications Prior to Admission   Medication Sig Dispense Refill Last Dose/Taking    metFORMIN 500 MG Oral Tab Take 1 tablet (500 mg total) by mouth daily with dinner. 20 tablet 0 Past Week    aspirin 81 MG Oral Tab EC Take 1 tablet (81 mg total) by mouth daily.   2/24/2025    Prenatal Multivit-Min-Fe-FA (PRENATAL VITAMINS OR) Take by mouth.   2/24/2025

## 2025-02-25 NOTE — OPERATIVE REPORT
PREOPERATIVE DIAGNOSIS:   1. 38 week pregnancy  2. Preeclampsia with severe features  3.  GDM A2  4. Fetal intolerance of labor    POSTOPERATIVE DIAGNOSIS:   same    PROCEDURE PERFORMED: primary low transverse  section.     SURGEON: Theron Haney MD    ASSISTANT: MD Cesario    ANESTHESIA: Epidural    FINDINGS: A male infant was delivered from cephalic presentation. Amniotic fluid was clear. Uterus and bilateral adnexa were normal.    PROCEDURE:   The involvement of the assisting physician was requested and provided aid in exposure/retraction, hemostasis, closure, and other intraoperative technical functions to help me as the primary surgeon carry out a safe operation with optimized results/outcome.  After informed consent, the patient was taken to the operating room, where anesthesia was dosed. The patient was placed supine in a left tilt. Rodrigues catheter was in place in the bladder and the abdomen was prepped and draped.   Incision:   [place] N     Pelosi Y  Incision was created using a combination of sharp, cautery, and blunt dissection. Once abdomen was safely entered, a bladder blade was placed. Transverse lower segment incision was made with a scalpel and extended digitally. Amniotomy: already occurred: Y   artificially performed with Allis: N  The fetal head was brought through the uterine incision with manual guidance and extrinsic abdominal pressure. Vacuum used: N  (disengagements: n/a)  The fetal body delivered without difficulty.   The infant was bulb suctioned. Cord was doubly clamped after the appropriate delay then cut, and the infant was taken to the neonatologist.   Placenta was delivered: manually N    expressed Y  An Colt retractor was placed, and the uterus was internally inspected and cleared of debris. Uterine incision more easily accessible w/bladder blade so Colt removed.  Uterine incision was closed with a continuous locking 0-Vicryl suture.    Second layer of imbrication:  N  Hemostasis aided by: Isolated hemostatic sutures Y  electrosurgery Y  Gutters were cleared. We rechecked the uterine and bladder flap hemostasis and with minimal cautery found it to be good.  Subfascial area was inspected and found to be dry. 0 Vicryl was used to close the fascia in a continuous running fashion. Subcutaneous tissue was irrigated, dried, and found to be hemostatic.  2-0 plain gut used to reapproximate the subcutaneous fat: Y  Skin was closed with 4-0 Vicryl in a subcuticular fashion. Mastisol and Steri-Strips were applied.   All sponge, needle, and instrument counts were correct. Estimated blood loss was 750 ml. Infant and mother went to the recovery room in good condition.

## 2025-02-25 NOTE — PLAN OF CARE
Problem: BIRTH - VAGINAL/ SECTION  Goal: Fetal and maternal status remain reassuring during the birth process  Description: INTERVENTIONS:  - Monitor vital signs  - Monitor fetal heart rate  - Monitor uterine activity  - Monitor labor progression (vaginal delivery)  - DVT prophylaxis (C/S delivery)  - Surgical antibiotic prophylaxis (C/S delivery)  Outcome: Completed     Problem: PAIN - ADULT  Goal: Verbalizes/displays adequate comfort level or patient's stated pain goal  Description: INTERVENTIONS:  - Encourage pt to monitor pain and request assistance  - Assess pain using appropriate pain scale  - Administer analgesics based on type and severity of pain and evaluate response  - Implement non-pharmacological measures as appropriate and evaluate response  - Consider cultural and social influences on pain and pain management  - Manage/alleviate anxiety  - Utilize distraction and/or relaxation techniques  - Monitor for opioid side effects  - Notify MD/LIP if interventions unsuccessful or patient reports new pain  - Anticipate increased pain with activity and pre-medicate as appropriate  Outcome: Completed     Problem: ANXIETY  Goal: Will report anxiety at manageable levels  Description: INTERVENTIONS:  - Administer medication as ordered  - Teach and rehearse alternative coping skills  - Provide emotional support with 1:1 interaction with staff  Outcome: Completed     Problem: Patient/Family Goals  Goal: Patient/Family Long Term Goal  Description: Patient's Long Term Goal: safe vaginal delivery      Interventions:  -   - See additional Care Plan goals for specific interventions  Outcome: Completed  Goal: Patient/Family Short Term Goal  Description: Patient's Short Term Goal: pain control in labor      Interventions:   -   - See additional Care Plan goals for specific interventions  Outcome: Completed

## 2025-02-25 NOTE — H&P
[facility]  History & Physical    Aniya Guy Patient Status:  Outpatient    1980 MRN [id number]   Location [facility] LABOR & DELIVERY Attending Agnes Cid MD   Hosp Day # 0 PCP Franco Valverde MD     SUBJECTIVE:    Aniya Guy is a 44 year old  woman at 38w2d gestation who presents from the office with elevated BP. She reports lightheadedness but no other symptoms. She reports good fetal movement, no vaginal bleeding, and no contractions.  She reports no headache, vision changes and RUQ pain.  Her pregnancy is significant for AMA and GDMA2 on metformin.    Obstetric History:   OB History    Para Term  AB Living   2 1 1 0 0 1   SAB IAB Ectopic Multiple Live Births   0 0 0   1      # Outcome Date GA Lbr Omar/2nd Weight Sex Type Anes PTL Lv   2 Current            1 Term 05 39w5d  5 lb 4 oz (2.381 kg) F Vag-Spont EPI  HELDER     Past Medical History:   Past Medical History:    Gestational diabetes (HCC)    Obesity     Past Social History:   Past Surgical History:   Procedure Laterality Date          Skin surgery      Hydratenitis    Tonsillectomy       Family History:   Family History   Problem Relation Age of Onset    Diabetes Mother     Hypertension Mother     Pulmonary Disease Father         Copd    Cancer Maternal Grandfather     Heart Disorder Maternal Grandmother     Cancer Paternal Grandfather      Social History:   Social History     Tobacco Use    Smoking status: Every Day     Current packs/day: 0.50     Average packs/day: 0.5 packs/day for 25.0 years (12.5 ttl pk-yrs)     Types: Cigarettes    Smokeless tobacco: Never   Substance Use Topics    Alcohol use: Not Currently       Home Meds: Prescriptions Prior to Admission[1]  Allergies: Allergies[2]    OBJECTIVE:    Pulse:  [69-71] 71  Resp:  [18] 18  BP: (166-178)/() 178/93    Abdomen: {soft, gravid, nontender   Fetal Surveillance:  125s, reactive, rare mild variable  [place]: rare   Cervix:       Lab Review:    Admission on 02/24/2025   Component Date Value    Glucose 02/24/2025 98     Sodium 02/24/2025 138     Potassium 02/24/2025 3.9     Chloride 02/24/2025 108     CO2 02/24/2025 22.0     Anion Gap 02/24/2025 8     BUN 02/24/2025 10     Creatinine 02/24/2025 0.54 (L)     Calcium, Total 02/24/2025 9.2     Calculated Osmolality 02/24/2025 285     eGFR-Cr 02/24/2025 117     AST 02/24/2025 21     ALT 02/24/2025 19     Alkaline Phosphatase 02/24/2025 78     Bilirubin, Total 02/24/2025 0.2 (L)     Total Protein 02/24/2025 7.1     Albumin 02/24/2025 3.8     Globulin  02/24/2025 3.3     A/G Ratio 02/24/2025 1.2     Patient Fasting for CMP? 02/24/2025 No     Uric Acid 02/24/2025 5.2     WBC 02/24/2025 14.4 (H)     RBC 02/24/2025 4.25     HGB 02/24/2025 12.9     HCT 02/24/2025 37.5     PLT 02/24/2025 224.0     MCV 02/24/2025 88.2     MCH 02/24/2025 30.4     MCHC 02/24/2025 34.4     RDW 02/24/2025 13.6     Neutrophil Absolute Prel* 02/24/2025 9.68 (H)     Neutrophil Absolute 02/24/2025 9.68 (H)     Lymphocyte Absolute 02/24/2025 3.54     Monocyte Absolute 02/24/2025 0.99     Eosinophil Absolute 02/24/2025 0.06     Basophil Absolute 02/24/2025 0.04     Immature Granulocyte Abs* 02/24/2025 0.05     Neutrophil % 02/24/2025 67.4     Lymphocyte % 02/24/2025 24.7     Monocyte % 02/24/2025 6.9     Eosinophil % 02/24/2025 0.4     Basophil % 02/24/2025 0.3     Immature Granulocyte % 02/24/2025 0.3     Total Protein Urine Rand* 02/24/2025 17.4 (H)     Creatinine Ur Random 02/24/2025 121.10     Urine Protein/Creatinine* 02/24/2025 0.14         ASSESSMENT:    38w2d gestation with gestational HTN    PLAN:    Fetal heart tracing reassuring  Plan labetalol per protocol  Induction of labor pending cervical check per protocol  Consider magnesium if BP remains elevated               [1]   Medications Prior to Admission   Medication Sig Dispense Refill Last Dose/Taking    metFORMIN 500 MG Oral Tab Take 1 tablet (500 mg total) by  mouth daily with dinner. 20 tablet 0 Past Week    aspirin 81 MG Oral Tab EC Take 1 tablet (81 mg total) by mouth daily.   2/24/2025    Prenatal Multivit-Min-Fe-FA (PRENATAL VITAMINS OR) Take by mouth.   2/24/2025   [2] No Known Allergies

## 2025-02-25 NOTE — PROGRESS NOTES
Comfortable with epidural anesthesia.  Cx scarred, 1.5 cm but easily stretched to 4 /90/-2  Discussed again potential for c/s if fetus not tolerating labor

## 2025-02-25 NOTE — PROGRESS NOTES
Now s/p 20, 40, 80mg IV labetalol.  Meets criteria for severe pre-eclampsia.  Will start magnesium for seizure ppx.  Will consider PO anti-hypertensives pending response to IV.  Cervix 1/50/-2, plan cytotec for IOL.

## 2025-02-25 NOTE — PROGRESS NOTES
Pt undergoing IOL for severe range HTN  Required IV labetalol for BP control last night, now on oral labetalol  BPs currently 116-153/62-85    S/p cytotec x 3, no maximiliano regularly and requesting epidural  Variable decels noted this AM, discussed w/pt and partner  Currently reassuring, will proceed w/epidural    On magnesium sulfate    GDM A2, on metformin  Last ultrasound showed EFW 21% growth

## 2025-02-25 NOTE — PROGRESS NOTES
Pt is a 44 year old female admitted to TRG5/TRG5-A.     Chief Complaint   Patient presents with    R/o Preeclampsia     Feeling light headed today on and off. Had elevated pressures in office. Denies head ache blurred vision. Active fetal movement. Denies any vaginal leaking or bleeding.       Pt is  38w2d intra-uterine pregnancy.  History obtained, consents signed. Oriented to room, staff, and plan of care.

## 2025-02-25 NOTE — ANESTHESIA POSTPROCEDURE EVALUATION
[facility]    Aniya Guy Patient Status:  Inpatient   Age/Gender 44 year old female MRN [id number]   Location [facility] LABOR & DELIVERY Attending Agnes Cid MD   Hosp Day # 1 PCP Franco Valverde MD       Anesthesia Post-op Note     SECTION    Procedure Summary       Date: 25 Room / Location:  L+D OR   L+D OR    Anesthesia Start: 1023 Anesthesia Stop: 1319    Procedure:  SECTION (Abdomen) Diagnosis: (Intrauterine pregnancy 38+3 weeks, Severe preeclampsia, Gestational Diabetes A2, Fetal intolerance to labor - delivered)    Surgeons: Theron Haney MD Anesthesiologist: Theron Eaton MD    Anesthesia Type: epidural ASA Status: 3            Anesthesia Type: epidural    Vitals Value Taken Time   BP 89/65 25 1321   Temp 97.7 25 1321   Pulse 72 25 1321   Resp 16 25 1321   SpO2 94 25 1321           Patient Location: Labor and Delivery    Anesthesia Type: epidural    Airway Patency: patent    Postop Pain Control: adequate    Mental Status: preanesthetic baseline    Nausea/Vomiting: none    Cardiopulmonary/Hydration status: stable euvolemic    Complications: no apparent anesthesia related complications    Postop vital signs: stable    Dental Exam: Unchanged from Preop    Patient to be discharged from PACU when criteria met.

## 2025-02-25 NOTE — ANESTHESIA PROCEDURE NOTES
Labor Analgesia    Date/Time: 2/25/2025 10:23 AM    Performed by: Abdiel Arora MD  Authorized by: Abdiel Arora MD      General Information and Staff    Start Time:  2/25/2025 10:23 AM  End Time:  2/25/2025 10:34 AM  Anesthesiologist:  Abdiel Arora MD  Performed by:  Anesthesiologist  Patient Location:  OB  Site Identification: surface landmarks    Reason for Block: labor epidural    Preanesthetic Checklist: patient identified, IV checked, risks and benefits discussed, monitors and equipment checked, pre-op evaluation, timeout performed, IV bolus, anesthesia consent and sterile technique used      Procedure Details    Patient Position:  Sitting  Prep: ChloraPrep    Monitoring:  Heart rate and continuous pulse ox  Approach:  Midline    Epidural Needle    Injection Technique:  ADRIAN saline  Needle Type:  Tuohy  Needle Gauge:  17 G  Needle Length:  3.375 in  Needle Insertion Depth:  7  Location:  L3-4    Spinal Needle      Catheter    Catheter Type:  End hole  Catheter Size:  19 G  Catheter at Skin Depth:  13  Test Dose:  Negative    Assessment      Additional Comments

## 2025-02-25 NOTE — PROGRESS NOTES
Repetitive fhr decelerations and variability decreased at times  Discussed w/pt and family  Proceed w/c/s for fetal intolerance  of labor

## 2025-02-26 NOTE — PROGRESS NOTES
[facility] 1SW-J sharron Guy Patient Status:  Inpatient   Age/Gender 44 year old female MRN [id number]   Location [facility] 1SW-J Attending Agnes Cid MD   Hosp Day # 2 PCP Franco Valverde MD      Anesthesia Pain Progress Note    Anesthesia Technique:   Epidural Anesthesia     Pain Management Technique:  In addition to available oral supplemental and IV medications  Patient received neuraxial preservative free morphine for post procedural pain control.    Post Procedure Pain Quality:    Adequate    Pain Management Side Effects:  None     /56 (BP Location: Left arm)   Pulse 69   Temp 98.1 °F (36.7 °C) (Oral)   Resp 18   Wt 100.2 kg (221 lb)   LMP 2024 (Exact Date)   SpO2 97%   Breastfeeding Yes   BMI 41.76 kg/m²       Injection Site: Injection site is intact on inspection     Complications from Pain Management or Anesthesia:   None    All patient questions were answered.  Follow up pain management is separate from intraoperative anesthetic needs.  Pain care is transitioned to primary service, with management by oral medications.    Thank you for asking us to participate in the care of your patient.    Garima Ott MD, 25, 11:08 AM      Garima Ott MD, 25, 11:08 AM

## 2025-02-26 NOTE — PLAN OF CARE
Report received at 0300. Pt on magnesium.     Problem: GASTROINTESTINAL - ADULT  Goal: Minimal or absence of nausea and vomiting  Description: INTERVENTIONS:  - Maintain adequate hydration with IV or PO as ordered and tolerated  - Nasogastric tube to low intermittent suction as ordered  - Evaluate effectiveness of ordered antiemetic medications  - Provide nonpharmacologic comfort measures as appropriate  - Advance diet as tolerated, if ordered  - Obtain nutritional consult as needed  - Evaluate fluid balance  2/26/2025 0350 by Belle Sultana RN  Outcome: Progressing  2/26/2025 0350 by Belle Sultana RN  Outcome: Progressing  Goal: Maintains or returns to baseline bowel function  Description: INTERVENTIONS:  - Assess bowel function  - Maintain adequate hydration with IV or PO as ordered and tolerated  - Evaluate effectiveness of GI medications  - Encourage mobilization and activity  - Obtain nutritional consult as needed  - Establish a toileting routine/schedule  - Consider collaborating with pharmacy to review patient's medication profile  2/26/2025 0350 by Belle Sultana RN  Outcome: Progressing  2/26/2025 0350 by Belle Sultana RN  Outcome: Progressing  Goal: Maintains adequate nutritional intake (undernourished)  Description: INTERVENTIONS:  - Monitor percentage of each meal consumed  - Identify factors contributing to decreased intake, treat as appropriate  - Assist with meals as needed  - Monitor I&O, WT and lab values  - Obtain nutritional consult as needed  - Optimize oral hygiene and moisture  - Encourage food from home; allow for food preferences  - Enhance eating environment  2/26/2025 0350 by Belle Sultana RN  Outcome: Progressing  2/26/2025 0350 by Belle Sultana RN  Outcome: Progressing  Goal: Achieves appropriate nutritional intake (bariatric)  Description: INTERVENTIONS:  - Monitor for over-consumption  - Identify factors contributing to increased intake, treat as  appropriate  - Monitor I&O, WT and lab values  - Obtain nutritional consult as needed  - Evaluate psychosocial factors contributing to over-consumption  2/26/2025 0350 by Belle Sultana RN  Outcome: Progressing  2/26/2025 0350 by Belle Sultana, RN  Outcome: Progressing     Problem: POSTPARTUM  Goal: Long Term Goal:Experiences normal postpartum course  Description: INTERVENTIONS:  - Assess and monitor vital signs and lab values.  - Assess fundus and lochia.  - Provide ice/sitz baths for perineum discomfort.  - Monitor healing of incision/episiotomy/laceration, and assess for signs and symptoms of infection and hematoma.  - Assess bladder function and monitor for bladder distention.  - Provide/instruct/assist with pericare as needed.  - Provide VTE prophylaxis as needed.  - Monitor bowel function.  - Encourage ambulation and provide assistance as needed.  - Assess and monitor emotional status and provide social service/psych resources as needed.  - Utilize standard precautions and use personal protective equipment as indicated. Ensure aseptic care of all intravenous lines and invasive tubes/drains.  - Obtain immunization and exposure to communicable diseases history.  Outcome: Progressing  Goal: Optimize infant feeding at the breast  Description: INTERVENTIONS:  - Initiate breast feeding within first hour after birth.   - Monitor effectiveness of current breast feeding efforts.  - Assess support systems available to mother/family.  - Identify cultural beliefs/practices regarding lactation, letdown techniques, maternal food preferences.  - Assess mother's knowledge and previous experience with breast feeding.  - Provide information as needed about early infant feeding cues (e.g., rooting, lip smacking, sucking fingers/hand) versus late cue of crying.  - Discuss/demonstrate breast feeding aids (e.g., infant sling, nursing footstool/pillows, and breast pumps).  - Encourage mother/other family members to express  feelings/concerns, and actively listen.  - Educate father/SO about benefits of breast feeding and how to manage common lactation challenges.  - Recommend avoidance of specific medications or substances incompatible with breast feeding.  - Assess and monitor for signs of nipple pain/trauma.  - Instruct and provide assistance with proper latch.  - Review techniques for milk expression (breast pumping) and storage of breast milk. Provide pumping equipment/supplies, instructions and assistance, as needed.  - Encourage rooming-in and breast feeding on demand.  - Encourage skin-to-skin contact.  - Provide LC support as needed.  - Assess for and manage engorgement.  - Provide breast feeding education handouts and information on community breast feeding support.   Outcome: Progressing  Goal: Establishment of adequate milk supply with medication/procedure interruptions  Description: INTERVENTIONS:  - Review techniques for milk expression (breast pumping).   - Provide pumping equipment/supplies, instructions, and assistance until it is safe to breastfeed infant.  Outcome: Progressing  Goal: Experiences normal breast weaning course  Description: INTERVENTIONS:  - Assess for and manage engorgement.  - Instruct on breast care.  - Provide comfort measures.  Outcome: Progressing  Goal: Appropriate maternal -  bonding  Description: INTERVENTIONS:  - Assess caregiver- interactions.  - Assess caregiver's emotional status and coping mechanisms.  - Encourage caregiver to participate in  daily care.  - Assess support systems available to mother/family.  - Provide /case management support as needed.  Outcome: Progressing

## 2025-02-26 NOTE — PROGRESS NOTES
Postop Day 1    Pt without complaints.     Temp:  [97.7 °F (36.5 °C)-98.7 °F (37.1 °C)] 98.7 °F (37.1 °C)  Pulse:  [] 68  Resp:  [12-20] 16  BP: ()/(38-87) 109/60  SpO2:  [94 %-100 %] 99 %  Patient Vitals for the past 24 hrs:   BP Temp Temp src Pulse Resp SpO2   02/26/25 0738 109/60 -- -- 68 16 99 %   02/26/25 0645 107/65 -- -- 68 -- 98 %   02/26/25 0545 96/56 -- -- 66 16 94 %   02/26/25 0500 -- -- -- 66 -- 97 %   02/26/25 0445 97/61 98.7 °F (37.1 °C) Oral 66 16 95 %   02/26/25 0430 -- -- -- 65 -- 96 %   02/26/25 0415 -- -- -- 67 -- 96 %   02/26/25 0400 -- -- -- 66 -- 98 %   02/26/25 0345 106/63 -- -- 67 18 99 %   02/26/25 0330 -- -- -- 67 -- 98 %   02/26/25 0315 -- -- -- 66 -- 97 %   02/26/25 0300 92/55 98.5 °F (36.9 °C) Oral 62 16 96 %   02/26/25 0245 -- -- -- 72 -- 98 %   02/26/25 0230 -- -- -- 68 -- 98 %   02/26/25 0215 -- -- -- 66 -- 96 %   02/26/25 0211 -- -- -- 65 -- 98 %   02/26/25 0210 -- -- -- 65 -- 97 %   02/26/25 0209 -- -- -- 71 -- 98 %   02/26/25 0208 -- -- -- 68 -- 98 %   02/26/25 0207 -- -- -- 71 -- 98 %   02/26/25 0206 -- -- -- 72 -- 98 %   02/26/25 0205 -- -- -- 65 -- 98 %   02/26/25 0204 -- -- -- 66 -- 98 %   02/26/25 0203 -- -- -- 72 -- 98 %   02/26/25 0202 -- -- -- 69 -- 98 %   02/26/25 0201 -- -- -- 67 -- 97 %   02/26/25 0200 102/56 98.3 °F (36.8 °C) Oral 69 16 97 %   02/26/25 0159 -- -- -- 67 -- 96 %   02/26/25 0136 -- -- -- 67 -- 96 %   02/26/25 0135 -- -- -- 68 -- 96 %   02/26/25 0134 -- -- -- 69 -- 97 %   02/26/25 0133 -- -- -- 68 -- 96 %   02/26/25 0132 -- -- -- 67 -- 96 %   02/26/25 0131 -- -- -- 70 -- 97 %   02/26/25 0130 -- -- -- 72 -- 97 %   02/26/25 0129 -- -- -- 67 -- 97 %   02/26/25 0128 -- -- -- 71 -- 97 %   02/26/25 0127 -- -- -- 70 -- 97 %   02/26/25 0126 -- -- -- 71 -- 97 %   02/26/25 0125 -- -- -- 71 -- 97 %   02/26/25 0124 -- -- -- 72 -- 97 %   02/26/25 0100 -- -- -- -- 16 --   02/26/25 0046 -- -- -- 71 -- 96 %   02/26/25 0045 -- -- -- 72 -- 96 %   02/26/25 0044 --  -- -- 70 -- 97 %   02/26/25 0043 -- -- -- 69 -- 97 %   02/26/25 0042 -- -- -- 71 -- 98 %   02/26/25 0041 -- -- -- 72 -- 97 %   02/26/25 0040 -- -- -- 76 -- 98 %   02/26/25 0039 -- -- -- 75 -- 98 %   02/26/25 0038 -- -- -- 78 -- 98 %   02/26/25 0037 -- -- -- 79 -- 98 %   02/26/25 0036 -- -- -- 79 -- 98 %   02/26/25 0035 -- -- -- 78 -- 98 %   02/26/25 0034 -- -- -- 75 -- 97 %   02/26/25 0033 -- -- -- 74 -- 98 %   02/26/25 0032 -- -- -- 75 -- 98 %   02/26/25 0031 -- -- -- 77 -- 97 %   02/26/25 0030 -- -- -- 76 -- 97 %   02/26/25 0029 -- -- -- 76 -- 98 %   02/26/25 0028 -- -- -- 78 -- 98 %   02/26/25 0027 -- -- -- 72 -- 95 %   02/26/25 0026 -- -- -- 71 -- 97 %   02/26/25 0025 -- -- -- 68 -- 97 %   02/26/25 0024 -- -- -- 72 -- 97 %   02/26/25 0023 -- -- -- 73 -- 95 %   02/26/25 0022 -- -- -- 73 -- 98 %   02/26/25 0021 -- -- -- 72 -- 98 %   02/26/25 0020 -- -- -- 72 -- 97 %   02/26/25 0019 -- -- -- 69 -- 98 %   02/26/25 0018 -- -- -- 73 -- 98 %   02/26/25 0017 -- -- -- 74 -- 97 %   02/26/25 0016 -- -- -- 72 -- 98 %   02/26/25 0015 -- -- -- 72 -- 98 %   02/26/25 0014 -- -- -- 70 -- 97 %   02/26/25 0013 -- -- -- 70 -- 98 %   02/26/25 0012 -- -- -- 77 -- 97 %   02/26/25 0011 -- -- -- 70 -- 97 %   02/26/25 0010 -- -- -- 72 -- 97 %   02/26/25 0009 -- -- -- 70 -- 98 %   02/26/25 0008 -- -- -- 71 -- 97 %   02/26/25 0007 -- -- -- 74 -- 96 %   02/26/25 0006 -- -- -- 73 -- 98 %   02/26/25 0005 -- -- -- 72 -- 98 %   02/26/25 0004 -- -- -- 71 -- 98 %   02/26/25 0003 -- -- -- 72 -- 99 %   02/26/25 0002 -- -- -- 73 -- 99 %   02/26/25 0001 -- -- -- 76 -- 99 %   02/26/25 0000 -- -- -- 74 18 99 %   02/25/25 2359 -- -- -- 78 -- 97 %   02/25/25 2358 -- -- -- 74 -- 97 %   02/25/25 2357 -- -- -- 72 -- 97 %   02/25/25 2356 -- -- -- 71 -- 98 %   02/25/25 2355 -- -- -- 69 -- 97 %   02/25/25 2354 -- -- -- 71 -- 97 %   02/25/25 2353 -- -- -- 71 -- 97 %   02/25/25 2352 -- -- -- 72 -- 98 %   02/25/25 2351 -- -- -- 81 -- 97 %   02/25/25 2350 -- -- --  76 -- 96 %   02/25/25 2349 97/52 -- -- 72 -- 98 %   02/25/25 2348 -- -- -- 72 -- 97 %   02/25/25 2347 -- -- -- 79 -- 96 %   02/25/25 2346 -- -- -- 73 -- 95 %   02/25/25 2345 -- -- -- 72 -- 95 %   02/25/25 2344 -- -- -- 71 -- 96 %   02/25/25 2343 -- -- -- 73 -- 97 %   02/25/25 2342 -- -- -- 73 -- 97 %   02/25/25 2341 -- -- -- 75 -- 97 %   02/25/25 2340 -- -- -- 76 -- 97 %   02/25/25 2339 -- -- -- 76 -- 96 %   02/25/25 2338 -- -- -- 73 -- 97 %   02/25/25 2337 -- -- -- 76 -- 98 %   02/25/25 2336 -- -- -- 73 -- 98 %   02/25/25 2335 -- -- -- 72 -- 96 %   02/25/25 2334 -- -- -- 71 -- 96 %   02/25/25 2333 -- -- -- 71 -- 96 %   02/25/25 2332 -- -- -- 72 -- 96 %   02/25/25 2331 -- -- -- 73 -- 96 %   02/25/25 2330 -- -- -- 72 -- 96 %   02/25/25 2329 -- -- -- 74 -- 96 %   02/25/25 2328 -- -- -- 73 -- 95 %   02/25/25 2327 -- -- -- 73 -- 95 %   02/25/25 2326 -- -- -- 73 -- 95 %   02/25/25 2325 -- -- -- 73 -- 95 %   02/25/25 2324 -- -- -- 73 -- 95 %   02/25/25 2323 -- -- -- 73 -- 95 %   02/25/25 2322 -- -- -- 70 -- 96 %   02/25/25 2321 -- -- -- 77 -- 97 %   02/25/25 2320 -- -- -- 77 -- 98 %   02/25/25 2319 -- -- -- 74 -- 95 %   02/25/25 2318 -- -- -- 73 -- 96 %   02/25/25 2317 -- -- -- 74 -- 96 %   02/25/25 2316 -- -- -- 72 -- 96 %   02/25/25 2315 -- -- -- 73 -- 96 %   02/25/25 2314 -- -- -- 72 -- 95 %   02/25/25 2313 -- -- -- 72 -- 96 %   02/25/25 2312 -- -- -- 74 -- 96 %   02/25/25 2311 -- -- -- 73 -- 95 %   02/25/25 2310 -- -- -- 75 -- 96 %   02/25/25 2309 -- -- -- 74 -- 95 %   02/25/25 2308 -- -- -- 73 -- 95 %   02/25/25 2307 -- -- -- 73 -- 96 %   02/25/25 2306 -- -- -- 71 -- 98 %   02/25/25 2305 -- -- -- 73 -- 95 %   02/25/25 2304 -- -- -- 72 -- 96 %   02/25/25 2303 -- -- -- 76 -- 97 %   02/25/25 2302 -- -- -- 75 -- 99 %   02/25/25 2301 -- -- -- 72 -- 96 %   02/25/25 2300 98/59 -- -- 75 17 98 %   02/25/25 2259 -- -- -- 76 -- 96 %   02/25/25 2258 -- -- -- 74 -- 96 %   02/25/25 2257 -- -- -- 75 -- 96 %   02/25/25 2256 -- -- --  75 -- 96 %   02/25/25 2255 -- -- -- 73 -- 97 %   02/25/25 2254 -- -- -- 79 -- 97 %   02/25/25 2253 -- -- -- 73 -- 97 %   02/25/25 2252 -- -- -- 81 -- 97 %   02/25/25 2251 -- -- -- 82 -- 98 %   02/25/25 2200 109/64 98.1 °F (36.7 °C) Oral 70 18 98 %   02/25/25 2100 97/58 98 °F (36.7 °C) Oral 68 16 98 %   02/25/25 2000 98/56 -- -- 70 16 98 %   02/25/25 1900 114/66 98 °F (36.7 °C) Oral 74 16 97 %   02/25/25 1800 105/64 -- -- 66 18 97 %   02/25/25 1700 119/73 -- -- 63 16 100 %   02/25/25 1600 112/71 97.7 °F (36.5 °C) Oral 61 18 96 %   02/25/25 1500 96/62 -- -- 62 20 100 %   02/25/25 1445 90/60 -- -- 61 18 98 %   02/25/25 1430 91/54 -- -- 63 12 98 %   02/25/25 1415 (!) 89/68 -- -- 113 17 97 %   02/25/25 1400 (!) 88/57 -- -- 61 16 96 %   02/25/25 1345 (!) 79/38 -- -- 67 20 97 %   02/25/25 1330 (!) 86/46 -- -- -- -- --   02/25/25 1325 (!) 79/41 -- -- -- -- --   02/25/25 1320 (!) 86/51 97.7 °F (36.5 °C) Oral -- 16 --   02/25/25 1200 133/75 -- -- 72 -- 97 %   02/25/25 1131 126/69 -- -- 68 -- --   02/25/25 1126 122/59 97.8 °F (36.6 °C) Axillary 74 -- 96 %   02/25/25 1105 -- -- -- 69 -- 94 %   02/25/25 1101 130/75 -- -- 80 -- --   02/25/25 1100 -- -- -- 69 -- 94 %   02/25/25 1055 -- -- -- 76 -- 95 %   02/25/25 1053 144/78 -- -- 71 -- --   02/25/25 1051 131/79 -- -- 73 -- --   02/25/25 1030 147/87 -- -- 73 -- 98 %   02/25/25 1001 145/85 -- -- 74 20 --   02/25/25 0900 126/75 -- -- 72 18 95 %     Intake/Output Summary (Last 24 hours) at 2/26/2025 0829  Last data filed at 2/26/2025 0738  Gross per 24 hour   Intake 2339 ml   Output 3734 ml   Net -1395 ml     abd  soft, NT, ND, fundus firm below umbilicus, incision C/D/I  extr  trace edema, no calf tenderness    Recent Labs   Lab 02/24/25  1817 02/25/25  1941   RBC 4.25 3.74*   HGB 12.9 11.4*   HCT 37.5 34.4*   MCV 88.2 92.0   MCH 30.4 30.5   MCHC 34.4 33.1   RDW 13.6 13.6   NEPRELIM 9.68* 14.86*   WBC 14.4* 17.9*   .0 184.0   Accucheck  108    Impression: POD#1    Preeclampsia, on magnesium sulfate    A2GDM  Plan:  Labetalol 200 mg po bid  Ambulation encouraged.    Advance diet as tolerated.    Continue postpartum care.    Circumcision discussed, including but not limited to risk of bleeding, unsatisfactory outcome.

## 2025-02-26 NOTE — PROGRESS NOTES
ASSISTED PT UP TO BR TO VOID WITHOUT DIFFICULTY. SEE I&O FLOW SHEET FOR AMOUNT OF VOIDED URINE. PERICARE REINFORCED. PT TOLERATED UP WELL. SITTING UP IN CHAIR AT BEDSIDE.

## 2025-02-27 NOTE — PROGRESS NOTES
Postpartum Progress Note    SUBJECTIVE:    Post-op day #2 s/p primary  section, Pre-E s/p mag    No overnight events.  No complaints.  Ambulating, tolerating PO, voiding, passing flatus.      OBJECTIVE:    Vital signs in last 24 hours:  Temp:  [97.7 °F (36.5 °C)-99.7 °F (37.6 °C)] 97.7 °F (36.5 °C)  Pulse:  [61-81] 61  Resp:  [16-17] 17  BP: (108-137)/(46-64) 135/61  SpO2:  [97 %-100 %] 99 %  Input/Output:    Intake/Output Summary (Last 24 hours) at 2025 1115  Last data filed at 2025 1842  Gross per 24 hour   Intake --   Output 1600 ml   Net -1600 ml       Gen: appears well, nad  Abd: soft, appropriate post-op tenderness, fundus firm below umbilicus  Inc: c/d/i with sutures/steris  Laura: minimal lochia  Ext: nontender, minimal edema    Recent Labs   Lab 25  1817 25  1941 25  1028   RBC 4.25 3.74* 3.26*   HGB 12.9 11.4* 10.1*   HCT 37.5 34.4* 29.6*   MCV 88.2 92.0 90.8   MCH 30.4 30.5 31.0   MCHC 34.4 33.1 34.1   RDW 13.6 13.6 13.8   NEPRELIM 9.68* 14.86* 11.11*   WBC 14.4* 17.9* 13.5*   .0 184.0 173.0         ASSESSMENT/PLAN:    POD #2 s/p primary  section  Pre-E w/ SF: BP well controlled without meds  Recovering well  Continue current care  Anticipate discharge home tomorrow    Reena Cline MD  2025  11:15 AM

## 2025-02-28 NOTE — PLAN OF CARE

## 2025-02-28 NOTE — PLAN OF CARE
Problem: POSTPARTUM  Goal: Long Term Goal:Experiences normal postpartum course  Description: INTERVENTIONS:  - Assess and monitor vital signs and lab values.  - Assess fundus and lochia.  - Provide ice/sitz baths for perineum discomfort.  - Monitor healing of incision/episiotomy/laceration, and assess for signs and symptoms of infection and hematoma.  - Assess bladder function and monitor for bladder distention.  - Provide/instruct/assist with pericare as needed.  - Provide VTE prophylaxis as needed.  - Monitor bowel function.  - Encourage ambulation and provide assistance as needed.  - Assess and monitor emotional status and provide social service/psych resources as needed.  - Utilize standard precautions and use personal protective equipment as indicated. Ensure aseptic care of all intravenous lines and invasive tubes/drains.  - Obtain immunization and exposure to communicable diseases history.  Outcome: Progressing  Goal: Optimize infant feeding at the breast  Description: INTERVENTIONS:  - Initiate breast feeding within first hour after birth.   - Monitor effectiveness of current breast feeding efforts.  - Assess support systems available to mother/family.  - Identify cultural beliefs/practices regarding lactation, letdown techniques, maternal food preferences.  - Assess mother's knowledge and previous experience with breast feeding.  - Provide information as needed about early infant feeding cues (e.g., rooting, lip smacking, sucking fingers/hand) versus late cue of crying.  - Discuss/demonstrate breast feeding aids (e.g., infant sling, nursing footstool/pillows, and breast pumps).  - Encourage mother/other family members to express feelings/concerns, and actively listen.  - Educate father/SO about benefits of breast feeding and how to manage common lactation challenges.  - Recommend avoidance of specific medications or substances incompatible with breast feeding.  - Assess and monitor for signs of nipple  pain/trauma.  - Instruct and provide assistance with proper latch.  - Review techniques for milk expression (breast pumping) and storage of breast milk. Provide pumping equipment/supplies, instructions and assistance, as needed.  - Encourage rooming-in and breast feeding on demand.  - Encourage skin-to-skin contact.  - Provide LC support as needed.  - Assess for and manage engorgement.  - Provide breast feeding education handouts and information on community breast feeding support.   Outcome: Progressing  Goal: Appropriate maternal -  bonding  Description: INTERVENTIONS:  - Assess caregiver- interactions.  - Assess caregiver's emotional status and coping mechanisms.  - Encourage caregiver to participate in  daily care.  - Assess support systems available to mother/family.  - Provide /case management support as needed.  Outcome: Progressing     Problem: GASTROINTESTINAL - ADULT  Goal: Minimal or absence of nausea and vomiting  Description: INTERVENTIONS:  - Maintain adequate hydration with IV or PO as ordered and tolerated  - Nasogastric tube to low intermittent suction as ordered  - Evaluate effectiveness of ordered antiemetic medications  - Provide nonpharmacologic comfort measures as appropriate  - Advance diet as tolerated, if ordered  - Obtain nutritional consult as needed  - Evaluate fluid balance  Outcome: Completed  Goal: Maintains or returns to baseline bowel function  Description: INTERVENTIONS:  - Assess bowel function  - Maintain adequate hydration with IV or PO as ordered and tolerated  - Evaluate effectiveness of GI medications  - Encourage mobilization and activity  - Obtain nutritional consult as needed  - Establish a toileting routine/schedule  - Consider collaborating with pharmacy to review patient's medication profile  Outcome: Completed  Goal: Maintains adequate nutritional intake (undernourished)  Description: INTERVENTIONS:  - Monitor percentage of each meal  consumed  - Identify factors contributing to decreased intake, treat as appropriate  - Assist with meals as needed  - Monitor I&O, WT and lab values  - Obtain nutritional consult as needed  - Optimize oral hygiene and moisture  - Encourage food from home; allow for food preferences  - Enhance eating environment  Outcome: Completed  Goal: Achieves appropriate nutritional intake (bariatric)  Description: INTERVENTIONS:  - Monitor for over-consumption  - Identify factors contributing to increased intake, treat as appropriate  - Monitor I&O, WT and lab values  - Obtain nutritional consult as needed  - Evaluate psychosocial factors contributing to over-consumption  Outcome: Completed

## 2025-02-28 NOTE — PROGRESS NOTES
Postpartum Progress Note    SUBJECTIVE:    Post-op day #3 s/p primary  section.    No overnight events.  No complaints.  Denies HA, vision changes, RUQ pain.  Ambulating, tolerating PO, voiding, passing flatus and had a bowel movement.       OBJECTIVE:    Vital signs in last 24 hours:  Temp:  [97.5 °F (36.4 °C)-98.6 °F (37 °C)] 97.5 °F (36.4 °C)  Pulse:  [64-75] 70  Resp:  [16] 16  BP: (130-172)/(57-99) 172/82  Input/Output:  No intake or output data in the 24 hours ending 25 1137    Gen: appears well, nad  Abd: soft, appropriate post-op tenderness, fundus firm below umbilicus  Inc: c/d/i   Laura: minimal lochia  Ext: nontender, trace edema    Recent Labs   Lab 25  1817 25  1941 25  1028   RBC 4.25 3.74* 3.26*   HGB 12.9 11.4* 10.1*   HCT 37.5 34.4* 29.6*   MCV 88.2 92.0 90.8   MCH 30.4 30.5 31.0   MCHC 34.4 33.1 34.1   RDW 13.6 13.6 13.8   NEPRELIM 9.68* 14.86* 11.11*   WBC 14.4* 17.9* 13.5*   .0 184.0 173.0       ASSESSMENT/PLAN:  POD #3 s/p primary  section  Pre-eclampsia with severe features - s/p magnesium; 30mg Procardia XL started yesterday evening.  Severe range BP this AM despite this, given additional 30mg and increased to 60mg Procardia XL nightly starting tonight.  Continue to closely monitoring BP.    Discharge home tomorrow pending BP control     Valdo Vyas DO  2025  11:37 AM

## 2025-03-01 NOTE — PROGRESS NOTES
Patient AOx4, signed infant custody release electronically .  Patient up in wheelchair, going home with significant other , and baby in car seat, with their belongings

## 2025-03-01 NOTE — DISCHARGE INSTRUCTIONS
No lifting > 10 lbs  Wash incision with soap and water daily.  Keep dry.  Call office if you experience redness or drainage from your incision.  Call office if you have a fever > 100.4 or vaginal bleeding > 1 pad per hour for several hours.    It is normal for bleeding to last 4-6 weeks and change to bright red again in the second week after delivery.  No tampons, intercourse, or douche for 6 weeks.    Check your BP twice a day  Call the office if BP is > 160/100 or if you are having a persistent headache, vision changes or pain on the upper abdomen

## 2025-03-01 NOTE — PROGRESS NOTES
Postop Day 4    Pt without complaints.     Temp: 98.2 °F (36.8 °C)  Pulse: 74  Resp: 18  BP: 141/72  abd  soft, NT, ND, fundus firm below umbilicus, incision C/D/I  extr  trace edema, no calf tenderness    Impression: POD#4, severe preeclampsia  Plan:  Discharge instructions reviewed.    Home today if BPs stable.    Follow-up at 3-5 days, 2 wks and 6 wks postpartum.  Check home BPs.

## 2025-03-01 NOTE — DISCHARGE SUMMARY
[facility]  Discharge Summary    Aniya Guy Patient Status:  Inpatient    1980 MRN [id number]   Location [facility] 1SW-J Attending Agnes Cid MD   Hosp Day # 5 PCP Franco Valverde MD     Date of Admission: 2025    Date of Discharge: 3/1/25    Admitting Diagnosis: pregnancy  Pregnancy (HCC)    Discharge Diagnosis:   Patient Active Problem List   Diagnosis    Obesity complicating pregnancy (HCC)    Multigravida of advanced maternal age in third trimester (HCC)    GDM (gestational diabetes mellitus) ([facility])    Pregnancy (HCC)       Reason for Admission: IOL due to preeclampsia      Hospital Course: underwent primary c/s for fetal intolerance to labor. Routine post op course.      Procedures: LTCS    Complications: none    Disposition: Final discharge disposition not confirmed    Discharge Condition: Stable    Discharge Medications:   Current Discharge Medication List        START taking these medications    Details   labetalol 200 MG Oral Tab Take 1 tablet (200 mg total) by mouth every 12 (twelve) hours.  Qty: 60 tablet, Refills: 0      NIFEdipine ER 60 MG Oral Tablet 24 Hr Take 1 tablet (60 mg total) by mouth daily.  Qty: 30 tablet, Refills: 0           CONTINUE these medications which have NOT CHANGED    Details   Prenatal Multivit-Min-Fe-FA (PRENATAL VITAMINS OR) Take by mouth.           STOP taking these medications       metFORMIN 500 MG Oral Tab        aspirin 81 MG Oral Tab EC                          Agnes Cid MD  3/1/2025  12:14 PM

## 2025-03-01 NOTE — PROGRESS NOTES
Patient AOx4, up and about, VSS, seen by OB, ok to go home, postpartum care, meds and  care DC instructions reviewed and completed, and will make the follow up appointment as instructed, significant other at bedside, very supportive, and answered questions , LC also worked with patient and instructions completed, high blood pressure s/s and management reviewed, patient verbalized and demonstrated understanding of instructions

## 2025-03-04 NOTE — PROGRESS NOTES
UNABLE TO REACH MOM AT THIS TIME.  LEFT MESSAGE TO CHECK [facility] FOR ADDITIONAL INFORMATION AND TO CONTACT [facility] OFFICE WITH ANY URGENT QUESTIONS AND CONCERNS.

## 2025-04-29 NOTE — ED PROVIDER NOTES
Patient Seen in: [facility] In [place]      History     Chief Complaint   Patient presents with    Stroke     Stated Complaint:     Subjective:   HPI    Patient is a 44-year-old female presenting to the ED with stroke symptoms.  The history is obtained from patient as well as family at bedside.  The onset of her symptoms was at 4 PM.  Patient states that she was normal prior to the onset of her symptoms and was awake when it started.  She states that her right side of her face started to feel different almost like her eye was swollen.  She is 8 weeks postpartum.  Her pregnancy was complicated by gestational diabetes as well as hypertension.  She was taking medications but is no longer taking anything daily.  She has a very mild headache that is less than a 1 out of 10.  No difficulty speaking or swallowing.  No change in vision.  No dizziness.  No upper or lower extremity weakness or loss of sensation.  No difficulty with ambulation.  The patient is not currently breast-feeding    History of Present Illness               Objective:     Past Medical History:    Gestational diabetes (HCC)    Obesity              Past Surgical History:   Procedure Laterality Date          Skin surgery      Hydratenitis    Tonsillectomy                  Social History     Socioeconomic History    Marital status:    Tobacco Use    Smoking status: Every Day     Current packs/day: 0.50     Average packs/day: 0.5 packs/day for 25.0 years (12.5 ttl pk-yrs)     Types: Cigarettes    Smokeless tobacco: Never   Vaping Use    Vaping status: Never Used   Substance and Sexual Activity    Alcohol use: Not Currently    Drug use: Never   Other Topics Concern    Caffeine Concern No    Exercise No    Seat Belt No    Special Diet No    Stress Concern No    Weight Concern Yes     Social Drivers of Health     Food Insecurity: No Food Insecurity (2025)    NCSS - Food Insecurity     Worried About Running Out of Food  in the Last Year: No     Ran Out of Food in the Last Year: No   Transportation Needs: No Transportation Needs (2/24/2025)    NCSS - Transportation     Lack of Transportation: No   Stress: No Stress Concern Present (2/24/2025)    Stress     Feeling of Stress : No   Housing Stability: Not At Risk (2/24/2025)    NCSS - Housing/Utilities     Has Housing: Yes     Worried About Losing Housing: No     Unable to Get Utilities: No                                Physical Exam     ED Triage Vitals [04/28/25 2215]   BP (!) 173/103   Pulse 86   Resp 20   Temp 98 °F (36.7 °C)   Temp src Temporal   SpO2 97 %   O2 Device None (Room air)       Current Vitals:   Vital Signs  BP: 133/73  Pulse: 78  Resp: 18  Temp: 98 °F (36.7 °C)  Temp src: Temporal    Oxygen Therapy  SpO2: 97 %  O2 Device: None (Room air)        Physical Exam  Vitals and nursing note reviewed.   Constitutional:       General: She is not in acute distress.     Appearance: Normal appearance. She is well-developed. She is not ill-appearing or toxic-appearing.   HENT:      Head: Normocephalic and atraumatic.      Right Ear: External ear normal.      Left Ear: External ear normal.      Mouth/Throat:      Mouth: Mucous membranes are moist.      Pharynx: Oropharynx is clear.   Eyes:      Extraocular Movements: Extraocular movements intact.      Conjunctiva/sclera: Conjunctivae normal.      Pupils: Pupils are equal, round, and reactive to light.   Cardiovascular:      Rate and Rhythm: Normal rate and regular rhythm.      Heart sounds: Normal heart sounds.   Pulmonary:      Effort: Pulmonary effort is normal.      Breath sounds: Normal breath sounds.   Abdominal:      General: Abdomen is flat. Bowel sounds are normal. There is no distension.      Tenderness: There is no abdominal tenderness.   Musculoskeletal:      Right lower leg: No edema.      Left lower leg: No edema.   Skin:     General: Skin is warm.      Capillary Refill: Capillary refill takes less than 2 seconds.       Findings: No rash.   Neurological:      Mental Status: She is alert and oriented to person, place, and time.      Comments: Right-sided facial droop, with some inability of patient to raise the right eyebrow, inability to maintain closure of the right eye.  Motor strength is 5/5 BUE and BLE with sensation intact bilaterally.  Clear speech.  No aphasia.  Finger-to-nose and heel-to-shin intact bilaterally.   Psychiatric:         Mood and Affect: Mood normal.         Behavior: Behavior normal.       NIH stroke scale 2    Physical Exam                ED Course     Labs Reviewed   CBC WITH DIFFERENTIAL WITH PLATELET - Abnormal; Notable for the following components:       Result Value    Lymphocyte Absolute 4.12 (*)     All other components within normal limits   COMP METABOLIC PANEL (14) - Abnormal; Notable for the following components:    Glucose 109 (*)     Creatinine 0.54 (*)     Bilirubin, Total 0.2 (*)     Globulin  3.8 (*)     All other components within normal limits   POCT GLUCOSE - Abnormal; Notable for the following components:    POC Glucose 113 (*)     All other components within normal limits   PROTHROMBIN TIME (PT) - Normal   PTT, ACTIVATED - Normal   TROPONIN I HIGH SENSITIVITY - Normal   POCT PREGNANCY URINE - Normal   SCAN SLIDE     EKG    Rate, intervals and axes as noted on EKG Report.  Rate: 81  Rhythm: Sinus Rhythm  Reading: No ST changes, no prior EKG for comparison              Results                                 MDM      History obtained from patient.     Differential diagnosis includes Bell's palsy, CVA, mass, less likely sinus venous thrombosis but also considered.    Previous records reviewed.  Patient's recent office visit was 2025 and 2025 follow-up will be GYN status post  with postpartum hypertension.  Patient's BP at that time was 138/76.  It does report the patient had preeclampsia with severe features, received magnesium.  She was discharged on labetalol  200 twice daily and nifedipine 60 daily.  Patient states she is not currently taking this medication.  Patient discontinued her medication and on her most recent visit without taking any BP meds her reading was 118/68.    Testing considered and ordered includes CT/CTA head and neck.  Accu-Chek, CBC, CMP, coagulation studies, troponin, UCG    I reviewed all results.  CBC and CMP unremarkable.  Coagulation studies are normal.  Troponin is negative.  Accu-Chek is normal.  UCG      I also reviewed the official report which shows   CT STROKE CTA BRAIN/CTA NECK (W IV)(CPT=70496/98118)  Result Date: 4/28/2025  PROCEDURE:  CT STROKE CTA BRAIN/CTA NECK (W IV)(CPT=70496/99456)  COMPARISON:  None.  INDICATIONS:  Right facial droop.  TECHNIQUE  Noncontrast CT scanning is performed through the brain and CT angiography of the head and neck were obtained with non-ionic contrast. MIP images were obtained. Curved planar reformats were performed through the carotid and vertebral arteries. All measurements obtained in this exam were performed using NASCET. Dose reduction techniques were used. Dose information is transmitted to the ACR (American College of Radiology) NRDR (National Radiology Data Registry) which includes the Dose Index Registry.  PATIENT STATED HISTORY:(As transcribed by Technologist)  Patient having right sided facial droop    CONTRAST USED:  75cc of Isovue 370  FINDINGS: Please see the separately dictated noncontrast CT of the brain performed on the same day for further details.  The petrous, cavernous, and supraclinoid internal carotid arteries are unremarkable.  An anterior communicating artery is seen.  The branches of the anterior cerebral and middle cerebral arteries are unremarkable.  Diminutive bilateral posterior communicating arteries are noted.  The branches of the posterior cerebral and superior cerebellar arteries are unremarkable.  The basilar artery has a normal course and caliber.  The left vertebral  artery is dominant.  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  The origins of the branch vessels appear widely patent.  The bilateral subclavian arteries and innominate artery are otherwise unremarkable.  The common carotid arteries are widely patent.  The carotid bifurcations appear unremarkable.  There is no evidence of hemodynamically significant stenosis in the carotid bulbs by NASCET criteria.  The cervical internal carotid arteries are widely patent.  The vertebral arteries originate from the subclavian arteries.  The origins of the vertebral arteries are patent.  The cervical vertebral arteries are widely patent.  The left vertebral artery is mildly dominant.  Minimal atelectasis/scarring in the partially imaged upper lungs.  Mildly heterogenous thyroid gland.            CONCLUSION:   1. No evidence of intracranial aneurysm, flow-limiting stenosis, or focal arterial occlusion.  2. No evidence of occlusion, dissection, or flow-limiting stenosis in the cervical vertebral or carotid arteries. No evidence of hemodynamically significant carotid stenosis by NASCET criteria.  Please see above for further details.  LOCATION:  [facility]   Dictated by (CST): Alonso Darby MD on 4/28/2025 at 10:46 PM     Finalized by (CST): Alonso Darby MD on 4/28/2025 at 10:49 PM       CT STROKE BRAIN (NO IV)(CPT=70450)  Result Date: 4/28/2025  PROCEDURE:  CT STROKE BRAIN (NO IV)(CPT=70450)  COMPARISON:  None.  INDICATIONS:  stroke, facial droop  TECHNIQUE:  Noncontrast CT scanning is performed through the brain.  Dose reduction techniques were used. Dose information is transmitted to the ACR (American College of Radiology) NRDR (National Radiology Data Registry) which includes the Dose Index Registry.  PATIENT STATED HISTORY: (As transcribed by Technologist)  Patient having right sided facial droop   FINDINGS: Ventricles and sulci are within normal limits.  There is no midline shift or  mass-effect.  The basal cisterns are patent.  The gray-white matter differentiation is intact.  There is no acute intracranial hemorrhage or extra-axial fluid collection.  No evidence of acute territorial infarction.  There is no evident fracture.  Trace ethmoid mucosal thickening.  The mastoid air cells are unremarkable.             CONCLUSION:  No acute intracranial abnormality identified. If there is clinical concern for acute ischemia/infarction, an MRI of the brain would be recommended for further evaluation.  Critical results were discussed with Dr. Rodrigez at 2244 hours on 4/28/2025. Critical results were read back.  LOCATION:  [facility]   Dictated by (CST): Alonso Darby MD on 4/28/2025 at 10:37 PM     Finalized by (CST): Alonso Darby MD on 4/28/2025 at 10:45 PM         Others who assisted in patient's care included Dr. Reese.  Discussed physical exam findings as well as CT results.  Discussed whether additional imaging should be obtained at this time such as MRI however given classic symptoms for Bell's palsy with right-sided facial droop without upper extremity involvement, some limitation of raising of the right eyebrow as well as inability to close the right eye.  Therefore no additional imaging was obtained at this time but discussion regarding treatment with antivirals and steroids.  Outpatient follow-up for further evaluation.  All results and disposition plan were discussed with patient.  She was noted to be hypertensive initially upon arrival in the ED but was also very anxious about her symptoms as code stroke was initially called.  The patient BP improved prior to DC.  Discussed initiation of treatment as soon as possible.  Also discussed purchasing an eye patch at the drugstore as well as usage of eye lubrication, over-the-counter to prevent dryness and abrasion due to difficulty closing the right eye.      TNK/ NI Documentation:    Date/Time last known well:   4pm    NIHSS on presentation:  2     Chief Complaint   Patient presents with    Stroke     IV Tenecteplase (TNK) administered: No; Patient is not a Candidate for IV TNK due to: Out of time window period or time of onset unknown and Symptoms more consistent with [place] Palsy    Candidate for Endovascular thrombectomy (EVT): No; Patient is not a candidate for Endovascular Thrombectomy due to: No large vessel occlusion ( LVO)  on CTA/MRA imaging      Disposition: Discharge                  Medical Decision Making      Disposition and Plan     Clinical Impression:  1. Bell's palsy         Disposition:  Discharge  4/29/2025 12:10 am    Follow-up:  Candice Rico MD  [address] [place] [id number] [phone]    Schedule an appointment as soon as possible for a visit in  day(s)      [facility] in [address] [address]  [phone]  Follow up  IF SYMPTOMS WORSEN, PERSIST, OR NEW SYMPTOMS DEVEL          Medications Prescribed:  Discharge Medication List as of 4/29/2025 12:14 AM        START taking these medications    Details   predniSONE 20 MG Oral Tab Take 4 tablets (80 mg total) by mouth daily for 7 days., Normal, Disp-28 tablet, R-0      valACYclovir 1 G Oral Tab Take 1 tablet (1,000 mg total) by mouth 3 (three) times daily for 7 days., Normal, Disp-21 tablet, R-0             Supplementary Documentation:

## 2025-04-29 NOTE — ED INITIAL ASSESSMENT (HPI)
Pt to ED with right facial weakness, facial drop. Reports sx stated around 1600. Pt felt a sharp pain to her neck radiating to ear.

## 2025-05-08 NOTE — PATIENT INSTRUCTIONS
Instructions from Dr. Diehl:     Take gabapentin 300 mg at night for 3 days, then twice daily for 3 days, then 3 times a day  See ophthalmology  Schedule MRI brain  Start physical therapy  Tape the eye shut, and consider using an eye patch at night, and as often as possible during the day (at least until you are able to close the eye completely when you blink).  Taping is strongly preferred, because patch without taping still leaves the eye open and at risk for corneal injury.  Use lubricating eye drops 3-4 times a day (preservative-free drops)  Use lubricating eye ointment/gel at bedtime (systane, genteal, or generic would be fine) - this is thick and may cause temporarily vision blurring       ~~~~~~~~~~~~~~~~~~~~~~~          Refill policies:    Allow 2-3 business days for refills; controlled substances may take longer.  Contact your pharmacy at least 5 days prior to running out of medication and have them send an electronic request or submit request through the “request refill” option in your [employer] account.  Refills are not addressed on weekends; covering physicians do not authorize routine medications on weekends.  No narcotics or controlled substances are refilled after noon on Fridays or by on call physicians.  By law, narcotics must be electronically prescribed.  A 30 day supply with no refills is the maximum allowed.  If your prescription is due for a refill, you may be due for a follow up appointment.  To best provide you care, patients receiving routine medications need to be seen at least once a year.  Patients receiving narcotic/controlled substance medications need to be seen at least once every 3 months.  In the event that your preferred pharmacy does not have the requested medication in stock (e.g. Backordered), it is your responsibility to find another pharmacy that has the requested medication available.  We will gladly send a new prescription to that pharmacy at your request.    Scheduling  Tests:    If your physician has ordered radiology tests such as MRI or CT scans, please contact Central Scheduling at [phone] right away to schedule the test.  Once scheduled, the [facility] Centralized Referral Team will work with your insurance carrier to obtain pre-certification or prior authorization.  Depending on your insurance carrier, approval may take 3-10 days.  It is highly recommended patients assure they have received an authorization before having a test performed.  If test is done without insurance authorization, patient may be responsible for the entire amount billed.      Precertification and Prior Authorizations:  If your physician has recommended that you have a procedure or additional testing performed the [facility] Centralized Referral Team will contact your insurance carrier to obtain pre-certification or prior authorization.    You are strongly encouraged to contact your insurance carrier to verify that your procedure/test has been approved and is a COVERED benefit.  Although the [facility] Centralized Referral Team does its due diligence, the insurance carrier gives the disclaimer that \"Although the procedure is authorized, this does not guarantee payment.\"    Ultimately the patient is responsible for payment.   Thank you for your understanding in this matter.  Paperwork Completion:  If you require FMLA or disability paperwork for your recovery, please make sure to either drop it off or have it faxed to our office at [phone]. Be sure the form has your name and date of birth on it.  The form will be faxed to our Forms Department and they will complete it for you.  There is a 25$ fee for all forms that need to be filled out.  Please be aware there is a 10-14 day turnaround time.  You will need to sign a release of information (YUNIOR) form if your paperwork does not come with one.  You may call the Forms Department with any questions at [phone].  Their fax number is [phone].

## 2025-05-08 NOTE — PROGRESS NOTES
Neurology History & Physical     ASSESSMENT & PLAN:      ICD-10-CM    1. Bell palsy  G51.0 OP REFERRAL TO [facility]     OPHTHALMOLOGY - EXTERNAL      2. Right facial numbness  R20.0 MRI BRAIN (W+WO) (CPT=70553)      3. Facial pain  R51.9 MRI BRAIN (W+WO) (CPT=70553)      4. Weakness on right side of face  R29.810 MRI BRAIN (W+WO) (CPT=70553)        Likely right-sided Bell's palsy, House-Brackmann grade V, severe dysfunction (e.g., asymmetry at rest, only barely perceptible motion, no forehead motion, slight mouth movement).  This poses threat to bodily function.  However does have right V2 numbness and facial pain which are a bit atypical.  Obtain MRI brain with [facility].    Unfortunately, only minimally improved.  Taste and hearing are involved, suggests a more proximal lesion.  Prednisone and valacyclovir completed.  I advised to patch eye when sleeping and use eye drops QID + ointment at night.  We discussed prognosis, that recovery may take weeks to months, and may be incomplete.  Start PT.    Facial pain remains severe, titrate up gabapentin to 300 mg TID, and take naproxen 1 to 2 days a week as needed.  If not effective, consider oxcarbazepine.  She is not breastfeeding.    We discussed medication side effects, as well as symptoms that would warrant urgent/emergent evaluation. They verbalized understanding and agreement.    Return in about 2 months (around 7/8/2025).       ~~~~~~~~~~~~~~~~~~~~~~~~~~~    CHIEF COMPLAINT / REASON FOR VISIT:    Chief Complaint   Patient presents with    Neurologic Problem     Pt states right sided weakness and pain in face is severe      HISTORY OBTAINED FROM:  Patient and others as above  Chart review    HISTORY:  Aniya Guy is a 44 year old female with sudden onset of right facial weakness on 4/28/2025.  She was 8 weeks postpartum at the time, pregnancy complicated by gestational diabetes and preeclampsia.  Evaluation in the ER was negative as below.  Was  given prednisone and valacyclovir, she has completed those courses.    Initially, was right upper and lower facial weakness, hyperacusis on the right, without improvement in droopiness.  Eye may itch and burn.  Not doing eye drops consistently.  Could not tolerate eye patch or tape.    Currently, having facial pain on the right side (orbit + outer ear) including sensitivity.  No other headaches.  Swallowing is ok, still having drooling on the right.  (Had leftover GBP from , in past two days she tried one pill of this, as well as ibuprofen.  Unclear if these really helped, though GBP made her sleepy.)    DATA REVIEWED:  As documented in the history    2025  Head CT unremarkable  CTA head/neck unremarkable  (I independently analyzed and interpreted the above, and I agree with the reading physician report(s).)  CBC, CMP unremarkable  ER note    PHYSICAL EXAMINATION:  /90   Pulse 90   Resp 16   Wt 217 lb 12.8 oz (98.8 kg)   LMP 2024 (Exact Date)   BMI 41.15 kg/m²     Gen: in NAD  MSE: AAOx3, nl language, nl attn/conc, nl fund of knowledge  CN: PERRL, VFF; EOMI, no nystagmus; decreased sensation right ext ear canal; partially decreased right V2 sensation; right upper and lower facial weakness (upper- cannot completely close eyelid; unable to raise right eyebrow; lower- complete weakness); increased right sided hearing; nl palate elevation bilaterally; nl voice; nl shoulder shrug b/I; nl tongue movement  Motor: 5/5 x4; no drift; normal tone; no abnormal movements  Sensory: nl vibration x4  Coord: nl FTN b/I  Reflex: 2+ BUE and BLE  Gait: normal    No Known Allergies    Current medications:  No current outpatient medications on file prior to visit.     No current facility-administered medications on file prior to visit.        Past Medical History:    Gestational diabetes (HCC)    Obesity       Past Surgical History:   Procedure Laterality Date          Skin surgery       Hydratenitis    Tonsillectomy         Social History     Socioeconomic History    Marital status:    Tobacco Use    Smoking status: Every Day     Current packs/day: 0.50     Average packs/day: 0.5 packs/day for 25.0 years (12.5 ttl pk-yrs)     Types: Cigarettes    Smokeless tobacco: Never   Vaping Use    Vaping status: Never Used   Substance and Sexual Activity    Alcohol use: Not Currently    Drug use: Never   Other Topics Concern    Caffeine Concern Yes    Exercise No    Seat Belt No    Special Diet No    Stress Concern No    Weight Concern Yes       Family History   Problem Relation Age of Onset    Diabetes Mother     Hypertension Mother     Pulmonary Disease Father         Copd    Cancer Maternal Grandfather     Heart Disorder Maternal Grandmother     Cancer Paternal Grandfather        Tesha Diehl MD, FAES, FAAN  Board-Certified in Neurology, Epilepsy, and Clinical Neurophysiology  [facility] [place]

## 2025-07-15 NOTE — TELEPHONE ENCOUNTER
Pt is scheduled for MRI on 7/16. Pt is claustrophobic so she is requesting medication. She states she weighs 224 lbs. Yesterday she was prescribed a steroid and muscle relaxer for 5/7 days. Please advise, Pt's best call back number is [phone]. Endorsed to [facility].

## (undated) NOTE — LETTER
2024      Alisa Kyle MD  [address]  [facility] [address]  Via Outside Provider Messaging  Patient: Aniya Guy  : 1980    Dear Colleague:  Thank you for referring your patient to me for a Maternal Fetal Medicine evaluation. Please see my attached note for my findings and recommendations.      Should you have any questions or concerns, please do not hesitate to contact me at the number listed below.    Best Regards,      Christine Mazariegos MD  [facility]  [address]  [facility] [address]  [phone]    cc: No Recipients      [facility] Department of Maternal Fetal Medicine  Patient Name: Aniya Guy  Patient : 1980  Physician: Christine Mazariegos MD    Pt here for level II ultrasound/doctor consult  + FM  Pt c/o sharp left sided discomfort, denies uterine tightening, vag bleeding and leaking fluid.     Outpatient Maternal-Fetal Medicine Consultation    Dear Dr. Kyle    Thank you for requesting ultrasound evaluation and maternal fetal medicine consultation on your patient Aniya Guy.  As you are aware she is a 44 year old female  with a nagy pregnancy and an Estimated Date of Delivery: 3/8/25.  A maternal-fetal medicine consultation was requested secondary to Advanced Maternal Age and Obesity.  Her prenatal records and labs were reviewed.    ROS    HISTORY  OB History    Para Term  AB Living   2 1 1 0 0 0   SAB IAB Ectopic Multiple Live Births   0 0 0 0 0      # Outcome Date GA Lbr Omar/2nd Weight Sex Type Anes PTL Lv   2 Current            1 Term 05   5 lb 4 oz (2.381 kg) F NORMAL SPONT          Allergies:  Allergies[1]   Current Meds:  Current Outpatient Medications   Medication Sig Dispense Refill    aspirin 81 MG Oral Tab EC Take 1 tablet (81 mg total) by mouth daily.      Prenatal Multivit-Min-Fe-FA (PRENATAL VITAMINS OR) Take by mouth.          HISTORY:  Past Medical History:    Obesity      Past  Surgical History:   Procedure Laterality Date          Skin surgery      Hydratenitis    Tonsillectomy        Family History   Problem Relation Age of Onset    Diabetes Mother     Hypertension Mother     Pulmonary Disease Father         Copd    Cancer Maternal Grandfather     Heart Disorder Maternal Grandmother     Cancer Paternal Grandfather       Social History     Socioeconomic History    Marital status:    Tobacco Use    Smoking status: Every Day     Current packs/day: 0.50     Average packs/day: 0.5 packs/day for 25.0 years (12.5 ttl pk-yrs)     Types: Cigarettes   Substance and Sexual Activity    Alcohol use: Not Currently    Drug use: Never   Other Topics Concern    Caffeine Concern No    Exercise No    Seat Belt No    Special Diet No    Stress Concern No    Weight Concern Yes     Social Drivers of Health     Financial Resource Strain: Unknown (7/3/2024)    Financial Resource Strain     Difficulty of Paying Living Expenses: Patient declined     Med Affordability: No   Food Insecurity: No Food Insecurity (7/3/2024)    Food Insecurity     Food Insecurity: Never true   Transportation Needs: No Transportation Needs (7/3/2024)    Transportation Needs     Lack of Transportation: No   Stress: No Stress Concern Present (7/3/2024)    Stress     Feeling of Stress : No   Housing Stability: Low Risk  (7/3/2024)    Housing Stability     Housing Instability: No          PHYSICAL EXAMINATION:  /72 (BP Location: Right arm, Patient Position: Sitting, Cuff Size: large)   Pulse 78   Ht 5' 1\" (1.549 m)   Wt 203 lb (92.1 kg)   LMP 2024 (Exact Date)   BMI 38.36 kg/m²   Physical Exam  Constitutional:       Appearance: Normal appearance.   Abdominal:      Palpations: Abdomen is soft.      Tenderness: There is no abdominal tenderness.   Neurological:      Mental Status: She is alert.   Psychiatric:         Mood and Affect: Mood normal.         Behavior: Behavior normal.           OBSTETRIC  ULTRASOUND  The patient had a level II ultrasound today which revealed size consistent with dates and a normal detailed anatomic survey.  Ultrasound findings:   Single IUP in cephalic presentation.    Placenta is posterior.   A 3 vessel cord is noted.  Cardiac activity is present at 132 bpm   g ( 0 lb 13 oz);   MVP is 4.9 cm    The cervix was not able to be clearly visualized and appeared short by transabdominal ultrasound, therefore transvaginal ultrasound was performed. Transvaginal US findings: Cervix is closed, long and no funneling seen measuring 4 mm   Uterus and adnexa appeared normal today on ultrasound    The fetal measurements are consistent with the established EDC. No ultrasound evidence of structural abnormalities are seen today. The nasal bone is present. She understands that ultrasound exam cannot exclude genetic abnormalities and that genetic testing is recommended. The limitations of ultrasound were discussed.  See PACS/Imaging Tab For Complete Ultrasound Report  I interpreted the results and reviewed them with the patient.    DISCUSSION  During her visit we discussed and reviewed the following issues:  ADVANCED MATERNAL AGE    Background  I reviewed with the patient that pregnancies in women of advanced maternal age (35 or older at delivery) are associated with elevated risks. Specifically, there is a higher rate of:  Fetal malformations  Preeclampsia  Gestational diabetes  Intrauterine fetal death    As a result, enhanced pregnancy surveillance is advised for these patients including a comprehensive ultrasound to assess for fetal malformations (at 20 weeks) and a third trimester ultrasound assessment for fetal growth (at 32 weeks). In addition, weekly NST's (initiating at 36 weeks gestation for women 35-39 years and at 32 weeks gestation for women 40 years and older) are also advised. Routine obstetric care is more than adequate to assess for gestational diabetes and preeclampsia; hence, no  further significant alterations in obstetric care are advised.    Medical Complications    Women 35 years of age or older can expect to experience two to three fold higher rates of hospitalization,  delivery, and pregnancy-related complications when compared to their younger counterparts.  The two most common medical problems complicating these  pregnanccies are hypertension and diabetes.   The incidence of preeclampsia in the general obstetric population is 3 to 4 percent; this increases to 5 to 10 percent in women over age 40 and is as high as 35 percent in women over age 50.   The incidence of gestational diabetes in the general obstetric population is 3 percent, rising to 7 to 12 percent in women over age 40 and 20 percent in women over age 50.  Women 35 years of age or older are more likely to be delivered by . The  delivery rate in the general obstetric population of the [place] is almost 30 percent, compared to almost 50 percent in women over age 40 to 45 and almost 80 percent in women age 50 to 63.          Fetal Death        A decision analysis tool using data from the [place] [employer]  Database predicted a strategy of weekly antepartum testing and labor induction would lower the risk of unexplained fetal death in women 35 years of age or older. In this model, weekly testing starting at 36 weeks of gestation would drop the risk of fetal death from 5.2 to 1.3 per 1000 pregnancies. While a policy of antepartum testing in older women does increase the chance that a women will be induced (71 inductions per fetal death averted) and thereby increases her risk of having a  delivery, only 14 additional cesareans would need to be performed to avert one unexplained fetal death.  Hence, weekly NST's are advised for women of advanced maternal age; testing should be initiated at 36 weeks for women 35-39 years and at 32 weeks for women 40 years and older.    Fetal  Malformations    Cardiac malformations, clubfoot, and diaphragmatic hernia appear to occur with increased frequency in offspring of older women. These abnormalities are structural and unrelated to aneuploidy, thus they would not be detected by karyotype analysis.  For these reasons a complete, detailed ultrasound (level II) is advised even if the fetus has a normal karyotype.      Fetal Aneuploidy      Invasive Testing  I offered invasive genetic testing (amniocentesis, chorionic villus sampling) after reviewing the diagnostic accuracy of these tests as well as the procedure associated loss rate (1:500 for genetic amniocentesis).    She ultimately does not desire invasive genetic testing.     We discussed  the increased risk of chromosomal abnormalities associated with advanced maternal age at age  44 year old. She understands that ultrasound exam cannot exclude potential genetic abnormalities.  Her estimated risk based on maternal age at term with any chromosome abnormality is about 1: 30 and with Down Syndrome is about 1: 45.   We also discussed the risks and benefits of having  genetic testing ([facility] and amniocentesis) performed.      Non-invasive Pregnancy Testing (NIPT)  I reviewed current non-invasive screening options. Currently non-invasive pregnancy testing (NIPT) offers the highest detection rate (with the lowest false positive rate) for the detection of fetal aneuploidy amongst high-risk patients. The limitations of detailed mid-trimester sonography was reviewed with the patient. First trimester screening and second trimester multiple-marker serum serum screening as alternative aneuploidy screening options were also reviewed. However, both of these tests are associated with lower detection and higher false positive rates.    OBESITY:  Her BMI prior to pregnancy was 38  Obesity during pregnancy is associated with numerous maternal and  risks.  It is not clear whether obesity is a direct cause of  adverse pregnancy outcome or whether the association between obesity and adverse pregnancy outcome is due to factors such as diabetes mellitus.   Data suggest that obese women should be encouraged to undertake a weight reduction program (diet, exercise, behavior modification, and possibly bariatric surgery in some cases) prior to attempting to conceive.            Subfertility in obese women is most commonly related to ovulatory dysfunction, and, in some obese women, the ovulatory dysfunction is related to polycystic ovary syndrome (PCOS). It is also important to note that even among ovulatory women, increasing obesity is associated with decreasing spontaneous pregnancy rates.  The increased risk of miscarriage in obese women may be because such women often have PCOS or isolated insulin resistance.                 Due to its strong association with obesity in the general population, type 2 diabetes mellitus is one of the two most common medical complications of the obese . The increased risk of type 2 diabetes is primarily related to an exaggerated increase in insulin resistance in the obese state. It is reasonable to screen obese gravidas for undiagnosed pregestational diabetes in the first trimester.   Glucose intolerance associated with gestational diabetes generally resolves postpartum; however, obese women with a history of gestational diabetes have a two-fold increased prevalence of subsequent type 2 diabetes.           An association between obesity and hypertensive disorders during pregnancy has been consistently reported.  In particular, maternal weight and BMI are independent risk factors for preeclampsia.             Studies have found that the increased risk of  birth in obese gravidas is primarily associated with obesity-related medical and  complications, rather than an intrinsic predisposition to spontaneous  birth. Prevention of  birth in these patients,  therefore, should be directed toward prevention or management of medical and obstetrical complications.               Both prepregnancy obesity and excessive maternal weight gain before or during pregnancy contribute to an increased probability of  delivery.  It has also been hypothesized that obesity may lead to dystocia due to increased soft tissue deposition in the maternal pelvis.    delivery in the obese  is associated with numerous perioperative concerns, including emergency delivery, prolonged incision to delivery interval, blood loss >1000 mL, longer operative times, wound infection, thromboembolism, and endometritis.            Maternal obesity appears to be associated with a small increase in the absolute rate of some congenital anomalies (primarily neural tube defect and cardiac), and the risk may increase with increasing maternal weight.  Level II ultrasound is advised for women with obesity.  The risk of neural tube defects increased significantly with maternal weight.    The analysis found that overweight and obese pregnant women experienced significantly more stillbirths than normal weight women.  Third trimester  testing is advised.     Prevention of Preeclampsia    Antiplatelet Agents  The observation that preeclampsia is associated with increased platelet turnover and increased platelet-derived thromboxane levels led to randomized trials evaluating low-dose aspirin therapy in women thought to be at increased risk of the disease. As opposed to higher dose aspirin therapy, low-dose aspirin (60 to 150 mg per day) diminishes platelet thromboxane synthesis while maintaining vascular wall prostacyclin synthesis. Although not well studied, the beneficial effect of low-dose aspirin for prevention of preeclampsia may also be in part related to modulation of inflammation. The drug has been studied for both prevention of preeclampsia and prevention of progression from mild to  severe disease. It appears to result in a modest reduction in risk of preeclampsia when given to women at moderate to high risk of preeclampsia.  This approach has been studied in over 35,000 women, for both prevention of preeclampsia and prevention of progression of the disease. Low dose aspirin has a modest impact on pregnancy outcome; it reduces the risk of preeclampsia, as well as other adverse pregnancy outcomes (eg,  delivery, fetal growth restriction) by about 10 to 20 percent. Low dose aspirin is safe in pregnancy; thus, it is a reasonable strategy in women with a moderate to high risk of preeclampsia in whom the benefits outweigh the risks.     Clinical Guidelines  Based on the available data, low-dose aspirin is advised for women at high risk for preeclampsia. There is no consensus on the criteria that confer high risk. The [place] Preventive Services Task Force (USPSTF) risk criteria are reasonable.  USPSTF criteria for high risk include one or more of the following:   Previous pregnancy with preeclampsia, especially early onset and with an adverse outcome   Multifetal gestation  Chronic hypertension   Type 1 or 2 diabetes mellitus  Chronic kidney disease  Autoimmune disease (antiphospholipid syndrome, systemic lupus erythematosus)     Women with multiple moderate risk factors for preeclampsia are considered high risk, as well. Identification of women with an appropriate combination of moderate risk factors to be considered high risk is subjective and determined case by case, as the data describing the magnitude of the association between each of these risk factors and development of preeclampsia vary widely and lack consistency. USPSTF criteria for moderate risk include:  Nulliparity  Obesity (body mass index >30 kg/m2)  Family history of preeclampsia in mother or sister  Age >=35 years  Sociodemographic characteristics (, low socioeconomic level)  Personal risk factors (eg,  history of low birth weight or small for gestational age, previous adverse pregnancy outcome, >10 year pregnancy interval)     If used, daily low-dose aspirin should be initiated in the 12th or 13th week of gestation, although adverse effects from earlier initiation (eg, preconception) have not been documented. The optimum low dose is unclear; 81 mg is readily available, but 100 or 150 mg (which are not available in some countries including the [place] may be more effective.  In some pregnant women, platelet function is not inhibited by the 81 mg dose but is altered by higher dosing. Hence, current evidence has suggested a daily dose of 100 to 150 mg of aspirin rather than a lower dose. In the [place], this can be achieved by taking one and one-half 81 mg tablets; however, taking one 81 mg tablet is also reasonable since this is the commercially available dose and has proven efficacy. For prevention of preeclampsia, no trials have evaluated the efficacy of a higher dose of aspirin, which could be achieved easily by taking two 81 mg tablets or splitting a 325 mg tablet. For prevention of myocardial infarction and stroke in nonpregnant individuals, aspirin appears to be equally effective at doses between 75 and 325 mg per day.  The safety of low-dose aspirin use in the second and third trimesters is well established, but questions linger regarding use in the first trimester (possible increase in minor vaginal bleeding or gastroschisis). Early therapy (before 16 weeks) may be important since the pathophysiologic features of preeclampsia develop at this time, weeks before clinical disease is apparent. However, available evidence is inconsistent about the importance of early initiation of therapy, possibly because aspirin has major effects on prostacyclin production and endothelial function throughout gestation.  Aspirin is discontinued 5 to 10 days before expected delivery to diminish the risk of bleeding during  delivery; however, no adverse maternal or fetal effects related to low-dose aspirin have been proven.  Major questions remain as to which, if any, subgroups of women are more likely to benefit from low-dose aspirin therapy, when treatment should be started (first or second trimester), and the optimum dose to inhibit placental PGH synthase (cyclooxygenase) and also allow the anti-inflammatory effects of low-dose aspirin.             States that her family has small babies.  Only had 1 US during her prior pregnancy which would have been typical  for that time period.   She is 5 feet 1 in tall.    IMPRESSION:  IUP at 20w4d   AMA --NIPT low risk, declines invasive testing   Obesity      RECOMMENDATIONS:  Continue care with Dr. Kyle  Follow-up Growth ultrasound with BPP at 32 weeks.  Weekly NST's at 32 weeks.  Twice weekly testing at 38 weeks - weekly NST and weekly BPP.  Delivery at 39-40 weeks.  Continue low dose aspirin  mg daily  Under 11-20 lb weight gain for pregnancy.            Thank you for allowing me to participate in the care of your patient.  Please do not hesitate to contact me if additional questions or concerns arise.      Christine Mazariegos M.D.    40 minutes spent in review of records, patient consultation, documentation and coordination of care.  The relevant clinical matter(s) are summarized above.     Note to patient and family  The 21st Century Cures Act makes medical notes available to patients in the interest of transparency.  However, please be advised that this is a medical document.  It is intended as [id number] communication.  It is written and medical language may contain abbreviations or verbiage that are technical and unfamiliar.  It may appear blunt or direct.  Medical documents are intended to carry relevant information, facts as evident, and the clinical opinion of the practitioner.         [1] Not on File

## (undated) NOTE — LETTER
2025      Alisa Kyle MD  [address]  [facility] [address]  Via Outside Provider Messaging  Patient: Aniya Guy  : 1980    Dear Colleague:  Thank you for referring your patient to me for a Maternal Fetal Medicine evaluation. Please see my attached note for my findings and recommendations.      Should you have any questions or concerns, please do not hesitate to contact me at the number listed below.    Best Regards,      Avis Sharma MD  [facility]  [address]  [facility] [address]  [phone]    cc: No Recipients      [facility] Department of Maternal Fetal Medicine  Patient Name: Aniya Guy  Patient : 1980  Physician: Avis Sharma MD    Outpatient Maternal-Fetal Medicine Follow-Up     Dear Dr. Kyle,     Thank you for requesting ultrasound evaluation and maternal fetal medicine consultation on your patient Aniya Guy.  As you are aware she is a 44 year old female  with a Carrillo pregnancy and an Estimated Date of Delivery: 3/8/25.  She returned to maternal-fetal medicine today for a follow-up visit.  Her history was reviewed from her prior visit and there were no interval changes.    A 1 hour glucose tolerance test was elevated 24.We reviewed that she needs her 3-hour glucose tolerance test.  For the prenatal records she electively deferred this till after the holidays.  She reports that she is going to do the 3-hour test next week.     Antepartum Risk Factors  Advanced maternal age, low risk cell free DNA screen  Class II obesity complicating pregnancy      S: She reports good fetal movement.  She has no concerns at this time    PHYSICAL EXAMINATION:  /82 (BP Location: Radial, Patient Position: Sitting, Cuff Size: adult)   Pulse 73   Ht 5' 1\" (1.549 m)   Wt 216 lb (98 kg)   LMP 2024 (Exact Date)   BMI 40.81 kg/m²   General: alert and oriented, no acute distress  Abdomen: gravid, soft,  non-tender  Extremities: non-tender, no edema     OBSTETRIC ULTRASOUND  The patient had a follow-up fetal ultrasound today which I interpreted the results and reviewed them with the patient.    Ultrasound Findings:  Single IUP in cephalic presentation.    Placenta is posterior.   Cardiac activity is present at 158 bpm  EFW 2016 g ( 4 lb 7 oz); 42%.    PHILLIP is  12.5 cm.  MVP is 4.3 cm  BPP is 8/8.     The fetal measurements are consistent with established EDC. No gross ultrasound evidence of structural abnormalities are seen today. The patient understands that ultrasound cannot rule out all structural and chromosomal abnormalities.     See imaging tab for the complete US report.     DISCUSSION  During her visit we discussed and reviewed the following issues:  ADVANCED MATERNAL AGE    I reviewed with the patient that pregnancies in women of advanced maternal age (35 or older at delivery) are associated with elevated risks. Specifically, there is a higher rate of:  Fetal malformations  Preeclampsia  Gestational diabetes  Intrauterine fetal death    As a result, enhanced pregnancy surveillance is advised for these patients including a comprehensive ultrasound to assess for fetal malformations (at 20 weeks) and a third trimester ultrasound assessment for fetal growth (at 32 weeks). In addition, weekly NST's (initiating at 36 weeks gestation for women 35-39 years and at 32 weeks gestation for women 40 years and older) are also advised. Routine obstetric care is more than adequate to assess for gestational diabetes and preeclampsia; hence, no further significant alterations in obstetric care are advised.  See prior MFM note from 10/23/2024 for detailed review    OBESITY:  Her BMI prior to pregnancy was 38  Obesity during pregnancy is associated with numerous maternal and  risks which were discussed previously and reviewed.See prior MFM note from 10/23/2024 for detailed review    Prevention of Preeclampsia  The role  of low-dose aspirin prevention of preeclampsia was discussed previously and reviewed.  See prior [facility] note from 10/23/2024 for detailed review         States that her family has small babies.  Only had 1 US during her prior pregnancy which would have been typical  for that time period.   She is 5 feet 1 in tall.    We reviewed the signs and symptoms of preeclampsia,  labor and monitoring fetal movement.  We discussed reasons for her to call her physician.    We discussed the recommended plan of care based on her  risk factors.  Aniya and her significant other, Theron, had their questions answered to their satisfaction.      IMPRESSION:  IUP at 32w6d  Normal fetal growth and  testing  AMA --NIPT low risk, declined invasive testing previously  Obesity, class II with excessive gestational weight gain  Elevated Glucola screen      RECOMMENDATIONS:  Continue care with Dr. Kyle  Weekly NST's at 32 weeks.  Twice weekly testing at 38 weeks - weekly NST and weekly BPP.  Delivery at 39-40 weeks.  Continue low dose aspirin  mg daily  Limit additional weight gain  3-hour glucose tolerance test is needed.  If she is not able to perform this test, I would advise her to check her blood sugars 4 times daily for 2 weeks on her regular diet to determine if she has gestational diabetes         Total time spent 30 minutes this calendar day which includes preparing to see the patient including chart review, obtaining and/or reviewing additional medical history, performing a physical exam and evaluation, documenting clinical information in the electronic medical record, independently interpreting results, counseling the patient, communicating results to the patient/family/caregiver and coordinating care.     Case discussed with patient who demonstrated understanding and agreement with plan.     Thank you for allowing me to participate in the care of this patient.  Please feel free to contact me with  any questions.    Avis Sharma MD  Maternal-Fetal Medicine       Note to patient and family:  The 21st Century Cures Act makes medical notes available to patients in the interest of transparency.  However, please be advised that this is a medical document.  It is intended as a peer to peer communication.  It is written in medical language and may contain abbreviations or verbiage that are technical and unfamiliar.  It may appear blunt or direct.  Medical documents are intended to carry relevant information, facts as evident, and the clinical opinion of the practitioner.      Pt here for growth ultrasound/BPP  + FM  Pt states feeling occas uterine tightening, denies vag bleeding and leaking fluid.

## (undated) NOTE — LETTER
2025      Cabrera Crooks MD  [address]  [address]  [facility] [address]  Via Outside Provider Messaging  Patient: Aniya Guy  : 1980    Dear Colleague:  Thank you for referring your patient to me for a Maternal Fetal Medicine evaluation. Please see my attached note for my findings and recommendations.      Should you have any questions or concerns, please do not hesitate to contact me at the number listed below.    Best Regards,      Avis Sharma MD  [facility]  [address]  [facility] [address]  [phone]    cc: No Recipients      [facility] Department of Maternal Fetal Medicine  Patient Name: Aniya Guy  Patient : 1980  Physician: Avis Sharma MD    Outpatient Maternal-Fetal Medicine Follow-Up     Dear Dr. Kyle,     Thank you for requesting ultrasound evaluation and maternal fetal medicine consultation on your patient Aniya Guy.  As you are aware she is a 44 year old female  with a Carrillo pregnancy and an Estimated Date of Delivery: 3/8/25.  She returned to maternal-fetal medicine today for a follow-up visit.  Her history was reviewed from her prior visit and since that time she has been diagnosed with gestational diabetes       Antepartum Risk Factors  Advanced maternal age, low risk cell free DNA screen  Class II obesity complicating pregnancy  Gestational diabetes      S: She reports good fetal movement.  She has no concerns at this time.  She is on the GDM diet and testing her blood sugar 4 times daily.      PHYSICAL EXAMINATION:  /82 (BP Location: Right arm, Patient Position: Sitting, Cuff Size: large)   Pulse 70   Ht 5' 1\" (1.549 m)   Wt 219 lb (99.3 kg)   LMP 2024 (Exact Date)   BMI 41.38 kg/m²   General: alert and oriented, no acute distress  Abdomen: gravid, soft, non-tender  Extremities: non-tender, no edema     OBSTETRIC ULTRASOUND  The patient had a follow-up fetal ultrasound  today which I interpreted the results and reviewed them with the patient.    Ultrasound Findings:  Single IUP in cephalic presentation.    Placenta is posterior.   Cardiac activity is present at 130 bpm  EFW 2757 g ( 6 lb 1 oz); 21%.    PHILLIP is  6.1 cm.  MVP is 3.6 cm  BPP is 8/8.     The fetal measurements are consistent with established EDC. No gross ultrasound evidence of structural abnormalities are seen today. The patient understands that ultrasound cannot rule out all structural and chromosomal abnormalities.     See imaging tab for the complete US report.     DISCUSSION  During her visit we discussed and reviewed the following issues:  ADVANCED MATERNAL AGE    I reviewed with the patient that pregnancies in women of advanced maternal age (35 or older at delivery) are associated with elevated risks. Specifically, there is a higher rate of:  Fetal malformations  Preeclampsia  Gestational diabetes  Intrauterine fetal death    As a result, enhanced pregnancy surveillance is advised for these patients including a comprehensive ultrasound to assess for fetal malformations (at 20 weeks) and a third trimester ultrasound assessment for fetal growth (at 32 weeks). In addition, weekly NST's (initiating at 36 weeks gestation for women 35-39 years and at 32 weeks gestation for women 40 years and older) are also advised. Routine obstetric care is more than adequate to assess for gestational diabetes and preeclampsia; hence, no further significant alterations in obstetric care are advised.  See prior MFM note from 10/23/2024 for detailed review    OBESITY:  Her BMI prior to pregnancy was 38  Obesity during pregnancy is associated with numerous maternal and  risks which were discussed previously and reviewed.See prior MFM note from 10/23/2024 for detailed review    Prevention of Preeclampsia  The role of low-dose aspirin prevention of preeclampsia was discussed previously and reviewed.  See prior MFM note from  10/23/2024 for detailed review      GESTATIONAL DIABETES      The patient was informed of the potential implications and risks associated with GDM to her and her fetus, especially when poorly controlled. We discussed the increased incidence of macrosomia and the potential for related birth injury to her and her baby. We talked about the increase risks associated risk of fetal hyperinsulinemia, jaundice, electrolyte imbalance, seizure activity, IUFD and other adverse obstetric outcomes. We also reviewed her  increased risk of having diabetes later in life. The importance of good glycemic control and avoidance of prolonged hypoglycemia and hyperglycemia was addressed.    She was instructed on the diabetic diet on 2025; her diet was modified to provide three meals and three snacks with fewer carbohydrates.  She received instruction on self-monitoring of blood glucose.  She was advised to assess  her blood sugars four times daily (fasting and 2 hour postprandially).   Fasting blood glucose should be less than 95 mg/dl and two hour postprandial values should be less than 120 mg/dl.     Her capillary blood glucose records were reviewed today; her compliance with the recommended four times daily assessments (fasting and two-hour post-prandial) is fair.   Her overall glucose control is inadequate.    The patient is presently on diet therapy; her compliance with her diabetic diet regimen was reviewed and it is fair.     We compared and contrasted insulin (long and short acting) and metformin to manage blood sugars in pregnancy.  We discussed insulin as the gold standard therapy in pregnancy and that it does not cross to the fetal circulation.  Metformin is increasingly being used in pregnancy but the long term data on / childhood effects are lacking.  Metformin crosses the placenta and  levels are >70% of the maternal level at delivery.  Metformin is not associated with increased rates for NICU  admission,  hypoglycemia or LGA when its use controls the blood sugars to the recommended targets.  There is emerging information,however, that there is an increase in childhood obesity, and possibly metabolic syndrome in children exposed to Metformin throughout the entire pregnancy.  Currently, use in the third trimester for management of gestational diabetes is within the standard of care.     Based on the above discussion and her gestational age, and the need for improved glycemic control , metformin is advised      Medical Regimen Recommendation:   Continue ADA diet  Metformin 500 mg daily with dinner  Send blood sugar logs MFM weekly for review    We reviewed the signs and symptoms of preeclampsia,  labor and monitoring fetal movement.  We discussed reasons for her to call her physician.    We discussed the recommended plan of care based on her  risk factors.  Aniya and her significant other, Theron, had their questions answered to their satisfaction.      IMPRESSION:  IUP at 37w3d  Normal fetal growth and  testing  AMA --NIPT low risk, declined invasive testing previously  Obesity, class II with excessive gestational weight gain  Gestational diabetes; A2      RECOMMENDATIONS:  Continue care with Dr. Kyle  Twice weekly NSTs  Delivery at 38-39w6d for GDMA2 that is controlled  Continue low dose aspirin  mg daily  Limit additional weight gain         Total time spent 30 minutes this calendar day which includes preparing to see the patient including chart review, obtaining and/or reviewing additional medical history, performing a physical exam and evaluation, documenting clinical information in the electronic medical record, independently interpreting results, counseling the patient, communicating results to the patient/family/caregiver and coordinating care.     Case discussed with patient who demonstrated understanding and agreement with plan.     Thank you for  allowing me to participate in the care of this patient.  Please feel free to contact me with any questions.    Avis Sharma MD  Maternal-Fetal Medicine       Note to patient and family:  The 21st Century Cures Act makes medical notes available to patients in the interest of transparency.  However, please be advised that this is a medical document.  It is intended as a peer to peer communication.  It is written in medical language and may contain abbreviations or verbiage that are technical and unfamiliar.  It may appear blunt or direct.  Medical documents are intended to carry relevant information, facts as evident, and the clinical opinion of the practitioner.      Pt here for Diabetic Consult/Growth ultrasound/BPP  +fm noted per patient  Pt denies complaints .